# Patient Record
Sex: FEMALE | Race: WHITE | NOT HISPANIC OR LATINO | Employment: UNEMPLOYED | URBAN - METROPOLITAN AREA
[De-identification: names, ages, dates, MRNs, and addresses within clinical notes are randomized per-mention and may not be internally consistent; named-entity substitution may affect disease eponyms.]

---

## 2017-07-03 ENCOUNTER — HOSPITAL ENCOUNTER (OUTPATIENT)
Dept: URGENT CARE | Facility: CLINIC | Age: 57
Discharge: HOME | End: 2017-07-03
Attending: FAMILY MEDICINE | Admitting: FAMILY MEDICINE

## 2017-12-07 ENCOUNTER — HOSPITAL ENCOUNTER (OUTPATIENT)
Dept: LAB | Facility: HOSPITAL | Age: 57
Discharge: HOME | End: 2017-12-07
Attending: PSYCHIATRY & NEUROLOGY | Admitting: PSYCHIATRY & NEUROLOGY

## 2017-12-07 LAB
ALBUMIN SERPL-MCNC: 3.8 G/DL (ref 3.5–4.8)
ALPHA1 GLOB FLD ELPH-MCNC: 0.2 GM/DL (ref 0.1–0.4)
ALPHA2 GLOB SERPL ELPH-MCNC: 0.8 GM/DL (ref 0.5–1)
B-GLOBULIN SERPL ELPH-MCNC: 1.1 GM/DL (ref 0.7–1.4)
BASOPHILS # BLD AUTO: 0 10*3/UL (ref 0–0.2)
BASOPHILS NFR BLD AUTO: 1 % (ref 0–2)
CONV TOTAL PROTEIN: 6.4 G/DL (ref 6.1–7.9)
DIFFERENTIAL METHOD BLD: (no result)
EOSINOPHIL # BLD AUTO: 0.2 10*3/UL (ref 0–0.3)
EOSINOPHIL # BLD AUTO: 3 % (ref 0–3)
ERYTHROCYTE [DISTWIDTH] IN BLOOD BY AUTOMATED COUNT: 14.3 % (ref 11.5–14.5)
ERYTHROCYTE [SEDIMENTATION RATE] IN BLOOD BY WESTERGREN METHOD: 12 MM/HR (ref 0–30)
GAMMA GLOB SERPL ELPH-MCNC: 0.5 GM/DL (ref 0.6–1.6)
HCT VFR BLD AUTO: 42.2 % (ref 35–49)
HGB BLD-MCNC: 14.5 G/DL (ref 12–15)
INSULIN SERPL-ACNC: ABNORMAL U[IU]/ML
LYMPHOCYTES # BLD AUTO: 1.5 10*3/UL (ref 0.8–4.8)
LYMPHOCYTES NFR BLD AUTO: 30 % (ref 18–42)
MCH RBC QN AUTO: 31.1 PG (ref 26–32)
MCHC RBC AUTO-ENTMCNC: 34.3 G/DL (ref 32–36)
MCV RBC AUTO: 90.7 FL (ref 80–94)
MONOCYTES # BLD AUTO: 0.4 10*3/UL (ref 0.1–1.3)
MONOCYTES NFR BLD AUTO: 8 % (ref 2–11)
NEUTROPHILS # BLD AUTO: 3 10*3/UL (ref 2.3–8.6)
NEUTROPHILS NFR BLD AUTO: 58 % (ref 50–75)
NRBC BLD AUTO-RTO: 0 /100{WBCS}
NRBC/RBC NFR BLD MANUAL: 0 10*3/UL
PLATELET # BLD AUTO: 139 10*3/UL (ref 150–450)
PMV BLD AUTO: 7.8 FL (ref 7.4–10.4)
RBC # BLD AUTO: 4.65 10*6/UL (ref 4–5.4)
TSH SERPL-ACNC: 1.92 UIU/ML (ref 0.34–5.6)
VIT B12 SERPL-MCNC: 866 PG/ML (ref 180–914)
WBC # BLD AUTO: 5.1 10*3/UL (ref 4.5–11.5)

## 2017-12-08 LAB
ANA SER QL IA: NORMAL
CHROMATIN AB SERPL-ACNC: <20 [IU]/ML (ref 0–20)
T PALLIDUM IGG SER QL: NONREACTIVE

## 2018-05-29 ENCOUNTER — HOSPITAL ENCOUNTER (OUTPATIENT)
Dept: URGENT CARE | Facility: CLINIC | Age: 58
Discharge: HOME | End: 2018-05-29
Attending: FAMILY MEDICINE | Admitting: FAMILY MEDICINE

## 2018-10-30 ENCOUNTER — HOSPITAL ENCOUNTER (OUTPATIENT)
Dept: LAB | Facility: HOSPITAL | Age: 58
Discharge: HOME | End: 2018-10-30
Attending: SPECIALIST | Admitting: SPECIALIST

## 2018-10-30 LAB — TSH SERPL-ACNC: 0.97 UIU/ML (ref 0.34–5.6)

## 2018-11-16 ENCOUNTER — HOSPITAL ENCOUNTER (OUTPATIENT)
Dept: MAMMOGRAPHY | Facility: HOSPITAL | Age: 58
Discharge: HOME | End: 2018-11-16
Attending: NURSE PRACTITIONER | Admitting: NURSE PRACTITIONER

## 2019-02-12 ENCOUNTER — HOSPITAL ENCOUNTER (OUTPATIENT)
Dept: INFUSION THERAPY | Facility: HOSPITAL | Age: 59
Discharge: HOME | End: 2019-02-12
Attending: INTERNAL MEDICINE | Admitting: INTERNAL MEDICINE

## 2019-02-12 LAB
ALBUMIN SERPL-MCNC: 3.4 G/DL (ref 3.5–4.8)
ALBUMIN/GLOB SERPL: 1.6 {RATIO} (ref 1–1.7)
ALP SERPL-CCNC: 108 IU/L (ref 32–91)
ALT SERPL-CCNC: 14 IU/L (ref 14–54)
ANION GAP SERPL CALC-SCNC: 15.3 MMOL/L (ref 10–20)
AST SERPL-CCNC: 21 IU/L (ref 15–41)
BASOPHILS # BLD AUTO: 0 10*3/UL (ref 0–0.2)
BASOPHILS NFR BLD AUTO: 1 % (ref 0–2)
BILIRUB SERPL-MCNC: 0.3 MG/DL (ref 0.3–1.2)
BUN SERPL-MCNC: 10 MG/DL (ref 8–20)
BUN/CREAT SERPL: 14.3 (ref 5.4–26.2)
CALCIUM SERPL-MCNC: 9 MG/DL (ref 8.9–10.3)
CHLORIDE SERPL-SCNC: 105 MMOL/L (ref 101–111)
CONV CO2: 22 MMOL/L (ref 22–32)
CONV TOTAL PROTEIN: 5.5 G/DL (ref 6.1–7.9)
CREAT UR-MCNC: 0.7 MG/DL (ref 0.4–1)
CRP SERPL-MCNC: 0.37 MG/DL (ref 0–0.7)
DIFFERENTIAL METHOD BLD: (no result)
EOSINOPHIL # BLD AUTO: 0.5 10*3/UL (ref 0–0.3)
EOSINOPHIL # BLD AUTO: 8 % (ref 0–3)
ERYTHROCYTE [DISTWIDTH] IN BLOOD BY AUTOMATED COUNT: 13.2 % (ref 11.5–14.5)
ERYTHROCYTE [SEDIMENTATION RATE] IN BLOOD BY WESTERGREN METHOD: 36 MM/HR (ref 0–30)
GLOBULIN UR ELPH-MCNC: 2.1 G/DL (ref 2.5–3.8)
GLUCOSE SERPL-MCNC: 129 MG/DL (ref 65–99)
HCT VFR BLD AUTO: 35.4 % (ref 35–49)
HGB BLD-MCNC: 12.1 G/DL (ref 12–15)
LYMPHOCYTES # BLD AUTO: 1.2 10*3/UL (ref 0.8–4.8)
LYMPHOCYTES NFR BLD AUTO: 22 % (ref 18–42)
MCH RBC QN AUTO: 32.1 PG (ref 26–32)
MCHC RBC AUTO-ENTMCNC: 34.2 G/DL (ref 32–36)
MCV RBC AUTO: 93.8 FL (ref 80–94)
MONOCYTES # BLD AUTO: 0.3 10*3/UL (ref 0.1–1.3)
MONOCYTES NFR BLD AUTO: 6 % (ref 2–11)
NEUTROPHILS # BLD AUTO: 3.5 10*3/UL (ref 2.3–8.6)
NEUTROPHILS NFR BLD AUTO: 63 % (ref 50–75)
NRBC BLD AUTO-RTO: 0 /100{WBCS}
NRBC/RBC NFR BLD MANUAL: 0 10*3/UL
PLATELET # BLD AUTO: 175 10*3/UL (ref 150–450)
PMV BLD AUTO: 7.5 FL (ref 7.4–10.4)
POTASSIUM SERPL-SCNC: 3.3 MMOL/L (ref 3.6–5.1)
RBC # BLD AUTO: 3.77 10*6/UL (ref 4–5.4)
SODIUM SERPL-SCNC: 139 MMOL/L (ref 136–144)
WBC # BLD AUTO: 5.6 10*3/UL (ref 4.5–11.5)

## 2019-11-19 ENCOUNTER — TRANSCRIBE ORDERS (OUTPATIENT)
Dept: ADMINISTRATIVE | Facility: HOSPITAL | Age: 59
End: 2019-11-19

## 2019-11-19 DIAGNOSIS — Z12.39 SCREENING BREAST EXAMINATION: Primary | ICD-10-CM

## 2019-12-02 ENCOUNTER — HOSPITAL ENCOUNTER (OUTPATIENT)
Dept: MAMMOGRAPHY | Facility: HOSPITAL | Age: 59
Discharge: HOME | End: 2019-12-02
Admitting: NURSE PRACTITIONER

## 2019-12-02 DIAGNOSIS — Z12.39 SCREENING BREAST EXAMINATION: ICD-10-CM

## 2019-12-02 PROCEDURE — 77067 SCR MAMMO BI INCL CAD: CPT

## 2019-12-02 PROCEDURE — 77063 BREAST TOMOSYNTHESIS BI: CPT
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
## 2020-01-23 PROBLEM — Z82.3 FAMILY HISTORY OF CEREBROVASCULAR ACCIDENT (CVA): Status: ACTIVE | Noted: 2020-01-23

## 2020-01-23 PROBLEM — S93.622A SPRAIN OF TARSOMETATARSAL LIGAMENT OF LEFT FOOT: Status: ACTIVE | Noted: 2017-07-03

## 2020-01-23 PROBLEM — I10 HYPERTENSION: Status: ACTIVE | Noted: 2020-01-23

## 2020-01-23 PROBLEM — M86.9: Status: ACTIVE | Noted: 2019-02-12

## 2020-01-23 PROBLEM — Z83.3 FAMILY HISTORY OF DIABETES MELLITUS: Status: ACTIVE | Noted: 2020-01-23

## 2020-01-23 PROBLEM — Z80.3 FAMILY HISTORY OF MALIGNANT NEOPLASM OF BREAST: Status: ACTIVE | Noted: 2020-01-23

## 2020-01-23 PROBLEM — F32.A DEPRESSION: Status: ACTIVE | Noted: 2020-01-23

## 2020-01-23 PROBLEM — G47.30 SLEEP APNEA: Status: ACTIVE | Noted: 2020-01-23

## 2020-01-23 PROBLEM — S62.002A: Status: ACTIVE | Noted: 2018-05-29

## 2020-01-23 PROBLEM — F41.9 ANXIETY DISORDER: Status: ACTIVE | Noted: 2020-01-23

## 2020-01-23 PROBLEM — G43.909 HEADACHE, MIGRAINE: Status: ACTIVE | Noted: 2020-01-23

## 2020-01-23 RX ORDER — L-METHYLFOLATE-ALGAE-VIT B12-B6 CAP 3-90.314-2-35 MG 3-90.314-2-35 MG
1 CAP ORAL
COMMUNITY
End: 2020-01-24

## 2020-01-23 RX ORDER — MELOXICAM 15 MG/1
15 TABLET ORAL DAILY
COMMUNITY
Start: 2018-11-21

## 2020-01-23 RX ORDER — VILAZODONE HYDROCHLORIDE 40 MG/1
40 TABLET ORAL DAILY
COMMUNITY
Start: 2018-12-24

## 2020-01-23 RX ORDER — CYCLOBENZAPRINE HCL 10 MG
TABLET ORAL
COMMUNITY
Start: 2016-02-04 | End: 2020-12-02

## 2020-01-23 RX ORDER — VALSARTAN 320 MG/1
320 TABLET ORAL DAILY
COMMUNITY
Start: 2019-11-05

## 2020-01-23 RX ORDER — VALACYCLOVIR HYDROCHLORIDE 1 G/1
TABLET, FILM COATED ORAL
COMMUNITY
Start: 2012-08-21 | End: 2020-12-02

## 2020-01-23 RX ORDER — GABAPENTIN 300 MG/1
900 CAPSULE ORAL 2 TIMES DAILY
COMMUNITY
Start: 2019-01-21

## 2020-01-23 RX ORDER — HYDROCHLOROTHIAZIDE 25 MG/1
25 TABLET ORAL DAILY
COMMUNITY
Start: 2019-10-29

## 2020-01-23 RX ORDER — OMEPRAZOLE 20 MG/1
CAPSULE, DELAYED RELEASE ORAL
COMMUNITY
Start: 2019-11-04 | End: 2020-01-24

## 2020-01-23 RX ORDER — PRAVASTATIN SODIUM 40 MG
40 TABLET ORAL NIGHTLY
COMMUNITY
Start: 2019-10-27

## 2020-01-23 RX ORDER — METFORMIN HYDROCHLORIDE 500 MG/1
500 TABLET, EXTENDED RELEASE ORAL
COMMUNITY
Start: 2018-12-27

## 2020-01-23 RX ORDER — HYDROCODONE BITARTRATE AND ACETAMINOPHEN 5; 325 MG/1; MG/1
TABLET ORAL
Refills: 0 | COMMUNITY
Start: 2019-10-28 | End: 2020-12-02

## 2020-01-23 RX ORDER — TURMERIC ROOT EXTRACT 500 MG
1 TABLET ORAL DAILY
COMMUNITY

## 2020-01-23 RX ORDER — AMLODIPINE, VALSARTAN AND HYDROCHLOROTHIAZIDE 10; 320; 25 MG/1; MG/1; MG/1
TABLET ORAL
COMMUNITY
Start: 2017-01-23 | End: 2020-12-02

## 2020-01-23 RX ORDER — CHLORAL HYDRATE 500 MG
1000 CAPSULE ORAL 2 TIMES DAILY WITH MEALS
COMMUNITY

## 2020-01-23 RX ORDER — NYSTATIN 100000 [USP'U]/G
POWDER TOPICAL
COMMUNITY
Start: 2019-11-16 | End: 2020-12-02

## 2020-01-23 RX ORDER — TOPIRAMATE 50 MG/1
TABLET, FILM COATED ORAL
COMMUNITY
Start: 2019-11-14 | End: 2020-12-02

## 2020-01-23 RX ORDER — CEPHALEXIN 250 MG/1
CAPSULE ORAL
Refills: 0 | COMMUNITY
Start: 2019-10-28 | End: 2020-12-02

## 2020-01-23 RX ORDER — LORAZEPAM 0.5 MG/1
TABLET ORAL
COMMUNITY
Start: 2018-01-22 | End: 2020-12-02

## 2020-01-23 RX ORDER — BUTALBITAL, ACETAMINOPHEN, CAFFEINE AND CODEINE PHOSPHATE 300; 50; 40; 30 MG/1; MG/1; MG/1; MG/1
1 CAPSULE ORAL EVERY 6 HOURS
COMMUNITY
Start: 2018-01-22 | End: 2020-12-02

## 2020-01-23 RX ORDER — TRAMADOL HYDROCHLORIDE 50 MG/1
TABLET ORAL EVERY 6 HOURS
COMMUNITY
Start: 2016-02-04 | End: 2020-12-02

## 2020-01-23 NOTE — PROGRESS NOTES
Sleep medicine follow-up visit    Eve Skelton   1960  59 y.o. female   DATE OF SERVICE: 1/24/2020     Chief complaint alayna treated with cpap    Yearly f/u for cpap compliance, patient uses a nasal mask and goes through [employer] for supplies. Sleeping well.    RLS, doing well with Gabapentin, unchanged. Pt is  Diabetic and is having more neuropathy in feet.    Headache currently taking [id number], can't recall last headache.      New machine about 2017  On NPSG at [facility] , 4- patient had Moderate obstructive sleep apnea syndrome with apnea-hypopnea index of 17.0 per sleep hour, minimum SpO2 of 86%    The compliance data reviewed and the patient is on CPAP therapy at 10-20 cm/H2O and compliance data indicates excellent compliance with 95% usage for more than 4 hours with an average usage of 7 hours 32 minutes. AHI down to 2.6 with CPAP therapy and mean CPAP pressure 10.9 cm of water.  The patient's hypersomnia also resolved with [place] Sleepiness Scale score of 9 with CPAP therapy.  The patient feels great and is benefiting from it and is compliant.     Obesity, was up to 300, now down to 250, bmi 44    (Pt seeing dr NASH on Topamax for headaches)    Review of Systems   Constitutional: Negative for activity change and appetite change.   HENT: Negative for sinus pressure and sinus pain.    Eyes: Negative for discharge and itching.   Respiratory: Negative for cough and shortness of breath.    Gastrointestinal: Negative for abdominal pain and nausea.   Endocrine: Negative for cold intolerance and heat intolerance.   Genitourinary: Negative for urgency.   Musculoskeletal: Negative for back pain, neck pain and neck stiffness.   Neurological: Negative for light-headedness and headaches.   Psychiatric/Behavioral: Negative for dysphoric mood and hallucinations.     I reviewed and addressed ROS entered by MA.    The following portions of the patient's history were reviewed and updated  as appropriate: allergies, current medications, past family history, past medical history, past social history, past surgical history and problem list.      Family History   Problem Relation Age of Onset   • No Known Problems Mother    • Breast cancer Father    • Breast cancer Sister    • Breast cancer Brother    • No Known Problems Daughter    • No Known Problems Son    • No Known Problems Maternal Grandmother    • No Known Problems Paternal Grandmother    • No Known Problems Maternal Aunt    • No Known Problems Paternal Aunt        Past Medical History:   Diagnosis Date   • Anxiety    • Breast cancer (CMS/HCC)    • Diabetes (CMS/HCC)    • Headache    • HTN (hypertension)    • RLS (restless legs syndrome)        Social History     Socioeconomic History   • Marital status:      Spouse name: Not on file   • Number of children: Not on file   • Years of education: Not on file   • Highest education level: Not on file   Tobacco Use   • Smoking status: Former Smoker     Last attempt to quit:      Years since quittin.0   • Smokeless tobacco: Never Used   Substance and Sexual Activity   • Alcohol use: Yes   • Drug use: Never   • Sexual activity: Defer         Current Outpatient Medications:   •  aspirin 81 MG tablet, Take 81 mg by mouth Daily., Disp: , Rfl:   •  Butalbital-APAP-Caff-Cod -84-30 MG capsule, 1 capsule Every 6 (Six) Hours., Disp: , Rfl:   •  Cholecalciferol (VITAMIN D-1000 MAX ST) 25 MCG (1000 UT) tablet, Take 2,000 Units by mouth Daily., Disp: , Rfl:   •  cyclobenzaprine (FLEXERIL) 10 MG tablet, CYCLOBENZAPRINE HCL 10 MG TABS, Disp: , Rfl:   •  econazole nitrate (SPECTAZOLE) 1 % cream, , Disp: , Rfl:   •  gabapentin (NEURONTIN) 300 MG capsule, Take 300 mg by mouth 3 (Three) Times a Day., Disp: , Rfl:   •  glycopyrrolate (ROBINUL) 1 MG tablet, , Disp: , Rfl:   •  hydroCHLOROthiazide (HYDRODIURIL) 25 MG tablet, , Disp: , Rfl:   •  HYDROcodone-acetaminophen (NORCO) 5-325 MG per tablet, TK 1  T PO Q 6 HOURS PRN P, Disp: , Rfl: 0  •  L-Methylfolate-Algae 15-90.314 MG capsule, TK 1 C PO QAM, Disp: , Rfl:   •  L-Methylfolate-B6-B12 3-35-2 MG tablet, Take  by mouth., Disp: , Rfl:   •  LORazepam (ATIVAN) 0.5 MG tablet, ATIVAN 0.5 MG TABS, Disp: , Rfl:   •  meloxicam (MOBIC) 15 MG tablet, Take 15 mg by mouth Daily., Disp: , Rfl:   •  metFORMIN ER (GLUCOPHAGE-XR) 500 MG 24 hr tablet, Take 500 mg by mouth., Disp: , Rfl:   •  NYSTATIN 831101 UNIT/GM powder, , Disp: , Rfl:   •  Omega-3 Fatty Acids (FISH OIL) 1000 MG capsule capsule, Take  by mouth., Disp: , Rfl:   •  pravastatin (PRAVACHOL) 40 MG tablet, , Disp: , Rfl:   •  SSD 1 % cream, APPLY TOPICALLY TO THE AFFECTED AREA ON THE SKIN BID UNTIL HEALED, Disp: , Rfl:   •  topiramate (TOPAMAX) 100 MG tablet, , Disp: , Rfl:   •  traMADol (ULTRAM) 50 MG tablet, Every 6 (Six) Hours., Disp: , Rfl:   •  trimethoprim-polymyxin b (POLYTRIM) 19660-7.1 UNIT/ML-% ophthalmic solution, Apply  to eye(s) as directed by provider Every 4 (Four) Hours., Disp: , Rfl:   •  Turmeric 500 MG tablet, Take  by mouth., Disp: , Rfl:   •  valACYclovir (VALTREX) 1000 MG tablet, VALTREX 1 GM TABS, Disp: , Rfl:   •  valsartan (DIOVAN) 320 MG tablet, , Disp: , Rfl:   •  vilazodone (VIIBRYD) 40 MG tablet tablet, Take 40 mg by mouth Daily., Disp: , Rfl:   •  amLODIPine-Valsartan-HCTZ -25 MG tablet, AMLODIPINE-VALSARTAN-HCTZ -25 MG TABS, Disp: , Rfl:   •  buPROPion XL (WELLBUTRIN XL) 300 MG 24 hr tablet, Take 1 tablet by mouth., Disp: , Rfl:   •  cephalexin (KEFLEX) 250 MG capsule, TK 1 C PO Q 6 HOURS UNTIL ALL TAKEN, Disp: , Rfl: 0  •  Coenzyme Q10 (COQ-10) 100 MG capsule, Take  by mouth., Disp: , Rfl:   •  doxycycline (VIBRAMYCIN) 100 MG capsule, TK 1 C PO BID, Disp: , Rfl:   •  esomeprazole (nexIUM) 40 MG capsule, Take 1 capsule by mouth., Disp: , Rfl:   •  LORazepam (ATIVAN) 1 MG tablet, TAKE 1 TABLET BY MOUTH D PRN FOR ANXIETY, Disp: , Rfl:   •  Lurasidone HCl (LATUDA) 20 MG tablet  tablet, Take 1 tablet by mouth., Disp: , Rfl:   •  metoprolol succinate XL (TOPROL XL) 100 MG 24 hr tablet, Take 1 tablet by mouth., Disp: , Rfl:   •  mupirocin (BACTROBAN) 2 % ointment, , Disp: , Rfl:   •  topiramate (TOPAMAX) 50 MG tablet, , Disp: , Rfl:     No Known Allergies     PHYSICAL EXAMINATION:  Vitals:    01/24/20 0857   BP: 152/89   Pulse: 81      Body mass index is 44.29 kg/m².       HEENT: Normal.      EXTREMITIES: No edema.     IMPRESSION:     Patient with obstructive sleep apnea syndrome with hypersomnia successfully treated with CPAP therapy and is compliant and benefiting from it.     Obesity, pt is working on weight loss    RLS, controlled with gabapentin    RECOMMENDATIONS:   1. Continue present CPAP.   2. Follow up 1 year.   3. Pt encouraged to lose weight.    EPWORTH SLEEPINESS SCALE  Sitting and reading  3  WatchingTV  3  Sitting, inactive, in a public place  3  As a passenger in a car for 1 hour w/o a break  0  Lying down to rest in the afternoon  0  Sitting and talking to someone  0  Sitting quietly after a lunch  0  In a car, while stopped for traffic or a light  0  Total 9        This document has been electronically signed by Joseph Seipel, MD on January 24, 2020 9:37 AM

## 2020-01-24 ENCOUNTER — OFFICE VISIT (OUTPATIENT)
Dept: NEUROLOGY | Facility: CLINIC | Age: 60
End: 2020-01-24

## 2020-01-24 VITALS
DIASTOLIC BLOOD PRESSURE: 89 MMHG | HEART RATE: 81 BPM | SYSTOLIC BLOOD PRESSURE: 152 MMHG | BODY MASS INDEX: 43.36 KG/M2 | WEIGHT: 254 LBS | HEIGHT: 64 IN

## 2020-01-24 DIAGNOSIS — E66.01 MORBID OBESITY (HCC): ICD-10-CM

## 2020-01-24 DIAGNOSIS — G47.33 OBSTRUCTIVE SLEEP APNEA SYNDROME: Primary | ICD-10-CM

## 2020-01-24 DIAGNOSIS — G25.81 RESTLESS LEGS SYNDROME: ICD-10-CM

## 2020-01-24 PROCEDURE — 99214 OFFICE O/P EST MOD 30 MIN: CPT | Performed by: PSYCHIATRY & NEUROLOGY

## 2020-01-24 RX ORDER — MECOBAL/LEVOMEFOLAT CA/B6 PHOS 2-3-35 MG
TABLET ORAL
COMMUNITY
End: 2020-12-02

## 2020-01-24 RX ORDER — LURASIDONE HYDROCHLORIDE 20 MG/1
1 TABLET, FILM COATED ORAL
COMMUNITY
End: 2020-12-02

## 2020-01-24 RX ORDER — DOXYCYCLINE HYCLATE 100 MG/1
CAPSULE ORAL
COMMUNITY
Start: 2019-12-17 | End: 2020-12-02

## 2020-01-24 RX ORDER — LEVOMEFOLATE/ALGAL OIL 15-90.314
1 CAPSULE ORAL DAILY
COMMUNITY
Start: 2019-12-27

## 2020-01-24 RX ORDER — BUPROPION HYDROCHLORIDE 300 MG/1
1 TABLET ORAL EVERY MORNING
COMMUNITY

## 2020-01-24 RX ORDER — TOPIRAMATE 100 MG/1
100 TABLET, FILM COATED ORAL 2 TIMES DAILY
COMMUNITY
Start: 2020-01-16 | End: 2022-01-27 | Stop reason: SDUPTHER

## 2020-01-24 RX ORDER — LORAZEPAM 1 MG/1
1 TABLET ORAL DAILY PRN
COMMUNITY
Start: 2020-01-14

## 2020-01-24 RX ORDER — ESOMEPRAZOLE MAGNESIUM 40 MG/1
1 CAPSULE, DELAYED RELEASE ORAL
COMMUNITY
End: 2020-12-02

## 2020-01-24 RX ORDER — GLYCOPYRROLATE 1 MG/1
TABLET ORAL
COMMUNITY
End: 2020-12-02

## 2020-01-24 RX ORDER — SILVER SULFADIAZINE 1 %
CREAM (GRAM) TOPICAL
COMMUNITY
Start: 2020-01-20 | End: 2020-12-02

## 2020-01-24 RX ORDER — POLYMYXIN B SULFATE AND TRIMETHOPRIM 1; 10000 MG/ML; [USP'U]/ML
SOLUTION OPHTHALMIC EVERY 4 HOURS
COMMUNITY
End: 2020-12-02

## 2020-01-24 RX ORDER — METOPROLOL SUCCINATE 100 MG/1
1 TABLET, EXTENDED RELEASE ORAL
COMMUNITY
End: 2020-12-02

## 2020-02-11 RX ORDER — TOPIRAMATE 50 MG/1
TABLET, FILM COATED ORAL
Qty: 180 TABLET | Refills: 3 | OUTPATIENT
Start: 2020-02-11

## 2020-02-13 ENCOUNTER — TELEPHONE (OUTPATIENT)
Dept: NEUROLOGY | Facility: CLINIC | Age: 60
End: 2020-02-13

## 2020-02-13 DIAGNOSIS — G47.33 OBSTRUCTIVE SLEEP APNEA: Primary | ICD-10-CM

## 2020-05-20 ENCOUNTER — TRANSCRIBE ORDERS (OUTPATIENT)
Dept: ADMINISTRATIVE | Facility: HOSPITAL | Age: 60
End: 2020-05-20

## 2020-05-20 ENCOUNTER — LAB (OUTPATIENT)
Dept: LAB | Facility: HOSPITAL | Age: 60
End: 2020-05-20

## 2020-05-20 DIAGNOSIS — Z79.1 ENCOUNTER FOR LONG-TERM (CURRENT) USE OF NON-STEROIDAL ANTI-INFLAMMATORIES: Primary | ICD-10-CM

## 2020-05-20 DIAGNOSIS — Z79.1 ENCOUNTER FOR LONG-TERM (CURRENT) USE OF NON-STEROIDAL ANTI-INFLAMMATORIES: ICD-10-CM

## 2020-05-20 LAB
ALT SERPL W P-5'-P-CCNC: 28 U/L (ref 1–33)
AST SERPL-CCNC: 24 U/L (ref 1–32)
BUN BLD-MCNC: 20 MG/DL (ref 6–20)
CREAT BLD-MCNC: 0.9 MG/DL (ref 0.57–1)
GFR SERPL CREATININE-BSD FRML MDRD: 64 ML/MIN/1.73

## 2020-05-20 PROCEDURE — 84450 TRANSFERASE (AST) (SGOT): CPT

## 2020-05-20 PROCEDURE — 84460 ALANINE AMINO (ALT) (SGPT): CPT

## 2020-05-20 PROCEDURE — 36415 COLL VENOUS BLD VENIPUNCTURE: CPT

## 2020-05-20 PROCEDURE — 82565 ASSAY OF CREATININE: CPT

## 2020-05-20 PROCEDURE — 84520 ASSAY OF UREA NITROGEN: CPT

## 2020-11-24 ENCOUNTER — TRANSCRIBE ORDERS (OUTPATIENT)
Dept: ADMINISTRATIVE | Facility: HOSPITAL | Age: 60
End: 2020-11-24

## 2020-11-24 DIAGNOSIS — Z13.820 ENCOUNTER FOR SCREENING FOR OSTEOPOROSIS: Primary | ICD-10-CM

## 2020-11-24 DIAGNOSIS — Z12.31 SCREENING MAMMOGRAM, ENCOUNTER FOR: Primary | ICD-10-CM

## 2020-12-02 ENCOUNTER — HOSPITAL ENCOUNTER (OUTPATIENT)
Facility: HOSPITAL | Age: 60
Discharge: HOME | End: 2020-12-04
Attending: INTERNAL MEDICINE | Admitting: ORTHOPAEDIC SURGERY

## 2020-12-02 ENCOUNTER — APPOINTMENT (OUTPATIENT)
Dept: GENERAL RADIOLOGY | Facility: HOSPITAL | Age: 60
End: 2020-12-02

## 2020-12-02 DIAGNOSIS — S42.292A CLOSED FRACTURE OF HEAD OF LEFT HUMERUS, INITIAL ENCOUNTER: ICD-10-CM

## 2020-12-02 DIAGNOSIS — M25.412 EFFUSION OF LEFT SHOULDER JOINT: ICD-10-CM

## 2020-12-02 DIAGNOSIS — S42.222A CLOSED 2-PART DISPLACED FRACTURE OF SURGICAL NECK OF LEFT HUMERUS, INITIAL ENCOUNTER: Primary | ICD-10-CM
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
## 2020-12-02 PROBLEM — S42.309A CLOSED FRACTURE OF HUMERUS: Status: ACTIVE | Noted: 2020-12-02

## 2020-12-02 LAB
ABO GROUP BLD: NORMAL
ANION GAP SERPL CALCULATED.3IONS-SCNC: 12 MMOL/L (ref 5–15)
APTT PPP: 23.6 SECONDS (ref 24–31)
BASOPHILS # BLD AUTO: 0 10*3/MM3 (ref 0–0.2)
BASOPHILS NFR BLD AUTO: 0.3 % (ref 0–1.5)
BLD GP AB SCN SERPL QL: NEGATIVE
BUN SERPL-MCNC: 16 MG/DL (ref 6–20)
BUN/CREAT SERPL: 23.5 (ref 7–25)
CALCIUM SPEC-SCNC: 9.8 MG/DL (ref 8.6–10.5)
CHLORIDE SERPL-SCNC: 104 MMOL/L (ref 98–107)
CO2 SERPL-SCNC: 24 MMOL/L (ref 22–29)
CREAT SERPL-MCNC: 0.68 MG/DL (ref 0.57–1)
DEPRECATED RDW RBC AUTO: 43.8 FL (ref 37–54)
EOSINOPHIL # BLD AUTO: 0.2 10*3/MM3 (ref 0–0.4)
EOSINOPHIL NFR BLD AUTO: 1.6 % (ref 0.3–6.2)
ERYTHROCYTE [DISTWIDTH] IN BLOOD BY AUTOMATED COUNT: 13.4 % (ref 12.3–15.4)
GFR SERPL CREATININE-BSD FRML MDRD: 89 ML/MIN/1.73
GLUCOSE BLDC GLUCOMTR-MCNC: 119 MG/DL (ref 70–105)
GLUCOSE SERPL-MCNC: 117 MG/DL (ref 65–99)
HCT VFR BLD AUTO: 42.1 % (ref 34–46.6)
HGB BLD-MCNC: 14.5 G/DL (ref 12–15.9)
INR PPP: 1 (ref 0.93–1.1)
LYMPHOCYTES # BLD AUTO: 1.3 10*3/MM3 (ref 0.7–3.1)
LYMPHOCYTES NFR BLD AUTO: 13.1 % (ref 19.6–45.3)
MCH RBC QN AUTO: 32.1 PG (ref 26.6–33)
MCHC RBC AUTO-ENTMCNC: 34.3 G/DL (ref 31.5–35.7)
MCV RBC AUTO: 93.7 FL (ref 79–97)
MONOCYTES # BLD AUTO: 0.6 10*3/MM3 (ref 0.1–0.9)
MONOCYTES NFR BLD AUTO: 6.6 % (ref 5–12)
NEUTROPHILS NFR BLD AUTO: 7.7 10*3/MM3 (ref 1.7–7)
NEUTROPHILS NFR BLD AUTO: 78.4 % (ref 42.7–76)
NRBC BLD AUTO-RTO: 0.1 /100 WBC (ref 0–0.2)
PLATELET # BLD AUTO: 150 10*3/MM3 (ref 140–450)
PMV BLD AUTO: 7.5 FL (ref 6–12)
POTASSIUM SERPL-SCNC: 3.3 MMOL/L (ref 3.5–5.2)
PROTHROMBIN TIME: 11 SECONDS (ref 9.6–11.7)
RBC # BLD AUTO: 4.5 10*6/MM3 (ref 3.77–5.28)
RH BLD: POSITIVE
SARS-COV-2 RNA PNL SPEC NAA+PROBE: NOT DETECTED
SODIUM SERPL-SCNC: 140 MMOL/L (ref 136–145)
T&S EXPIRATION DATE: NORMAL
WBC # BLD AUTO: 9.8 10*3/MM3 (ref 3.4–10.8)

## 2020-12-02 PROCEDURE — 82962 GLUCOSE BLOOD TEST: CPT

## 2020-12-02 PROCEDURE — 87635 SARS-COV-2 COVID-19 AMP PRB: CPT | Performed by: NURSE PRACTITIONER

## 2020-12-02 PROCEDURE — 86850 RBC ANTIBODY SCREEN: CPT | Performed by: NURSE PRACTITIONER

## 2020-12-02 PROCEDURE — 86900 BLOOD TYPING SEROLOGIC ABO: CPT

## 2020-12-02 PROCEDURE — 25010000002 MORPHINE PER 10 MG: Performed by: NURSE PRACTITIONER

## 2020-12-02 PROCEDURE — 86901 BLOOD TYPING SEROLOGIC RH(D): CPT | Performed by: NURSE PRACTITIONER

## 2020-12-02 PROCEDURE — G0378 HOSPITAL OBSERVATION PER HR: HCPCS

## 2020-12-02 PROCEDURE — 80048 BASIC METABOLIC PNL TOTAL CA: CPT | Performed by: NURSE PRACTITIONER

## 2020-12-02 PROCEDURE — 25010000002 ONDANSETRON PER 1 MG: Performed by: NURSE PRACTITIONER

## 2020-12-02 PROCEDURE — 85025 COMPLETE CBC W/AUTO DIFF WBC: CPT | Performed by: NURSE PRACTITIONER

## 2020-12-02 PROCEDURE — 85610 PROTHROMBIN TIME: CPT | Performed by: NURSE PRACTITIONER

## 2020-12-02 PROCEDURE — 85730 THROMBOPLASTIN TIME PARTIAL: CPT | Performed by: NURSE PRACTITIONER

## 2020-12-02 PROCEDURE — 99283 EMERGENCY DEPT VISIT LOW MDM: CPT

## 2020-12-02 PROCEDURE — 86901 BLOOD TYPING SEROLOGIC RH(D): CPT

## 2020-12-02 PROCEDURE — 99219 PR INITIAL OBSERVATION CARE/DAY 50 MINUTES: CPT | Performed by: NURSE PRACTITIONER

## 2020-12-02 PROCEDURE — 86900 BLOOD TYPING SEROLOGIC ABO: CPT | Performed by: NURSE PRACTITIONER

## 2020-12-02 PROCEDURE — 96374 THER/PROPH/DIAG INJ IV PUSH: CPT

## 2020-12-02 PROCEDURE — 96375 TX/PRO/DX INJ NEW DRUG ADDON: CPT

## 2020-12-02 PROCEDURE — 93005 ELECTROCARDIOGRAM TRACING: CPT | Performed by: NURSE PRACTITIONER

## 2020-12-02 PROCEDURE — 71045 X-RAY EXAM CHEST 1 VIEW: CPT

## 2020-12-02 PROCEDURE — C9803 HOPD COVID-19 SPEC COLLECT: HCPCS

## 2020-12-02 RX ORDER — SODIUM CHLORIDE 0.9 % (FLUSH) 0.9 %
3 SYRINGE (ML) INJECTION EVERY 12 HOURS SCHEDULED
Status: DISCONTINUED | OUTPATIENT
Start: 2020-12-02 | End: 2020-12-03 | Stop reason: HOSPADM

## 2020-12-02 RX ORDER — DEXTROSE MONOHYDRATE 25 G/50ML
25 INJECTION, SOLUTION INTRAVENOUS
Status: DISCONTINUED | OUTPATIENT
Start: 2020-12-02 | End: 2020-12-04 | Stop reason: HOSPADM

## 2020-12-02 RX ORDER — BISACODYL 10 MG
10 SUPPOSITORY, RECTAL RECTAL DAILY PRN
Status: DISCONTINUED | OUTPATIENT
Start: 2020-12-02 | End: 2020-12-04 | Stop reason: HOSPADM

## 2020-12-02 RX ORDER — SODIUM CHLORIDE 0.9 % (FLUSH) 0.9 %
10 SYRINGE (ML) INJECTION AS NEEDED
Status: DISCONTINUED | OUTPATIENT
Start: 2020-12-02 | End: 2020-12-04 | Stop reason: HOSPADM

## 2020-12-02 RX ORDER — BUPROPION HYDROCHLORIDE 150 MG/1
300 TABLET ORAL DAILY
Status: DISCONTINUED | OUTPATIENT
Start: 2020-12-03 | End: 2020-12-04 | Stop reason: HOSPADM

## 2020-12-02 RX ORDER — SODIUM CHLORIDE 0.9 % (FLUSH) 0.9 %
3-10 SYRINGE (ML) INJECTION AS NEEDED
Status: DISCONTINUED | OUTPATIENT
Start: 2020-12-02 | End: 2020-12-03 | Stop reason: HOSPADM

## 2020-12-02 RX ORDER — GABAPENTIN 300 MG/1
600 CAPSULE ORAL DAILY
COMMUNITY

## 2020-12-02 RX ORDER — HYDROCHLOROTHIAZIDE 25 MG/1
25 TABLET ORAL DAILY
Status: DISCONTINUED | OUTPATIENT
Start: 2020-12-03 | End: 2020-12-03 | Stop reason: HOSPADM

## 2020-12-02 RX ORDER — MORPHINE SULFATE 4 MG/ML
4 INJECTION, SOLUTION INTRAMUSCULAR; INTRAVENOUS ONCE
Status: COMPLETED | OUTPATIENT
Start: 2020-12-02 | End: 2020-12-02

## 2020-12-02 RX ORDER — OMEPRAZOLE 20 MG/1
20 CAPSULE, DELAYED RELEASE ORAL DAILY
COMMUNITY

## 2020-12-02 RX ORDER — ONDANSETRON 2 MG/ML
4 INJECTION INTRAMUSCULAR; INTRAVENOUS EVERY 6 HOURS PRN
Status: DISCONTINUED | OUTPATIENT
Start: 2020-12-02 | End: 2020-12-03 | Stop reason: HOSPADM

## 2020-12-02 RX ORDER — MORPHINE SULFATE 4 MG/ML
4 INJECTION, SOLUTION INTRAMUSCULAR; INTRAVENOUS EVERY 4 HOURS PRN
Status: DISCONTINUED | OUTPATIENT
Start: 2020-12-02 | End: 2020-12-03 | Stop reason: HOSPADM

## 2020-12-02 RX ORDER — GABAPENTIN 300 MG/1
600 CAPSULE ORAL DAILY
Status: DISCONTINUED | OUTPATIENT
Start: 2020-12-03 | End: 2020-12-04 | Stop reason: HOSPADM

## 2020-12-02 RX ORDER — NICOTINE POLACRILEX 4 MG
15 LOZENGE BUCCAL
Status: DISCONTINUED | OUTPATIENT
Start: 2020-12-02 | End: 2020-12-04 | Stop reason: HOSPADM

## 2020-12-02 RX ORDER — PANTOPRAZOLE SODIUM 40 MG/1
40 TABLET, DELAYED RELEASE ORAL
Status: DISCONTINUED | OUTPATIENT
Start: 2020-12-03 | End: 2020-12-04 | Stop reason: HOSPADM

## 2020-12-02 RX ORDER — MELATONIN
2000 DAILY
Status: DISCONTINUED | OUTPATIENT
Start: 2020-12-03 | End: 2020-12-04 | Stop reason: HOSPADM

## 2020-12-02 RX ORDER — GABAPENTIN 300 MG/1
900 CAPSULE ORAL 2 TIMES DAILY
Status: DISCONTINUED | OUTPATIENT
Start: 2020-12-03 | End: 2020-12-04 | Stop reason: HOSPADM

## 2020-12-02 RX ORDER — BISACODYL 5 MG/1
5 TABLET, DELAYED RELEASE ORAL DAILY PRN
Status: DISCONTINUED | OUTPATIENT
Start: 2020-12-02 | End: 2020-12-04 | Stop reason: HOSPADM

## 2020-12-02 RX ORDER — ATORVASTATIN CALCIUM 10 MG/1
10 TABLET, FILM COATED ORAL DAILY
Status: DISCONTINUED | OUTPATIENT
Start: 2020-12-03 | End: 2020-12-04 | Stop reason: HOSPADM

## 2020-12-02 RX ORDER — INSULIN LISPRO 100 [IU]/ML
0-7 INJECTION, SOLUTION INTRAVENOUS; SUBCUTANEOUS
Status: DISCONTINUED | OUTPATIENT
Start: 2020-12-03 | End: 2020-12-04 | Stop reason: HOSPADM

## 2020-12-02 RX ORDER — TOPIRAMATE 100 MG/1
100 TABLET, FILM COATED ORAL 2 TIMES DAILY
Status: DISCONTINUED | OUTPATIENT
Start: 2020-12-03 | End: 2020-12-04 | Stop reason: HOSPADM

## 2020-12-02 RX ORDER — INSULIN LISPRO 100 [IU]/ML
0-7 INJECTION, SOLUTION INTRAVENOUS; SUBCUTANEOUS AS NEEDED
Status: DISCONTINUED | OUTPATIENT
Start: 2020-12-02 | End: 2020-12-04 | Stop reason: HOSPADM

## 2020-12-02 RX ORDER — ONDANSETRON 2 MG/ML
4 INJECTION INTRAMUSCULAR; INTRAVENOUS ONCE
Status: COMPLETED | OUTPATIENT
Start: 2020-12-02 | End: 2020-12-02

## 2020-12-02 RX ORDER — VALSARTAN 80 MG/1
320 TABLET ORAL DAILY
Status: DISCONTINUED | OUTPATIENT
Start: 2020-12-03 | End: 2020-12-04 | Stop reason: HOSPADM

## 2020-12-02 RX ORDER — DEXTROSE AND SODIUM CHLORIDE 5; .45 G/100ML; G/100ML
100 INJECTION, SOLUTION INTRAVENOUS CONTINUOUS
Status: DISCONTINUED | OUTPATIENT
Start: 2020-12-02 | End: 2020-12-03

## 2020-12-02 RX ORDER — VILAZODONE HYDROCHLORIDE 40 MG/1
40 TABLET ORAL DAILY
Status: DISCONTINUED | OUTPATIENT
Start: 2020-12-03 | End: 2020-12-04 | Stop reason: HOSPADM

## 2020-12-02 RX ADMIN — MORPHINE SULFATE 4 MG: 4 INJECTION INTRAVENOUS at 19:39

## 2020-12-02 RX ADMIN — ONDANSETRON 4 MG: 2 INJECTION, SOLUTION INTRAMUSCULAR; INTRAVENOUS at 19:39

## 2020-12-02 NOTE — ED NOTES
Pt presents to the ED after having an xray done at [facility] after sustaining a fall earlier this morning. Pt was told she had a broken left arm and needed to come to the ED for further evaluation and treatment. Pt wearing a sling upon arrival.      Lachelle Charles, RN  12/02/20 5621

## 2020-12-03 ENCOUNTER — ANESTHESIA EVENT (OUTPATIENT)
Dept: PERIOP | Facility: HOSPITAL | Age: 60
End: 2020-12-03

## 2020-12-03 ENCOUNTER — APPOINTMENT (OUTPATIENT)
Dept: GENERAL RADIOLOGY | Facility: HOSPITAL | Age: 60
End: 2020-12-03

## 2020-12-03 ENCOUNTER — ANESTHESIA (OUTPATIENT)
Dept: PERIOP | Facility: HOSPITAL | Age: 60
End: 2020-12-03

## 2020-12-03 PROBLEM — S42.222A CLOSED 2-PART DISPLACED FRACTURE OF SURGICAL NECK OF LEFT HUMERUS: Status: ACTIVE | Noted: 2020-12-02

## 2020-12-03 LAB
ANION GAP SERPL CALCULATED.3IONS-SCNC: 9 MMOL/L (ref 5–15)
BASOPHILS # BLD AUTO: 0 10*3/MM3 (ref 0–0.2)
BASOPHILS NFR BLD AUTO: 0.3 % (ref 0–1.5)
BUN SERPL-MCNC: 17 MG/DL (ref 6–20)
BUN/CREAT SERPL: 22.7 (ref 7–25)
CALCIUM SPEC-SCNC: 9 MG/DL (ref 8.6–10.5)
CHLORIDE SERPL-SCNC: 106 MMOL/L (ref 98–107)
CO2 SERPL-SCNC: 25 MMOL/L (ref 22–29)
CREAT SERPL-MCNC: 0.75 MG/DL (ref 0.57–1)
DEPRECATED RDW RBC AUTO: 45.9 FL (ref 37–54)
EOSINOPHIL # BLD AUTO: 0.2 10*3/MM3 (ref 0–0.4)
EOSINOPHIL NFR BLD AUTO: 2.6 % (ref 0.3–6.2)
ERYTHROCYTE [DISTWIDTH] IN BLOOD BY AUTOMATED COUNT: 13.9 % (ref 12.3–15.4)
GFR SERPL CREATININE-BSD FRML MDRD: 79 ML/MIN/1.73
GLUCOSE BLDC GLUCOMTR-MCNC: 120 MG/DL (ref 70–105)
GLUCOSE BLDC GLUCOMTR-MCNC: 124 MG/DL (ref 70–105)
GLUCOSE BLDC GLUCOMTR-MCNC: 125 MG/DL (ref 70–105)
GLUCOSE BLDC GLUCOMTR-MCNC: 195 MG/DL (ref 70–105)
GLUCOSE SERPL-MCNC: 121 MG/DL (ref 65–99)
HBA1C MFR BLD: 5.6 % (ref 3.5–5.6)
HCT VFR BLD AUTO: 40 % (ref 34–46.6)
HGB BLD-MCNC: 13.5 G/DL (ref 12–15.9)
LYMPHOCYTES # BLD AUTO: 1.5 10*3/MM3 (ref 0.7–3.1)
LYMPHOCYTES NFR BLD AUTO: 21.9 % (ref 19.6–45.3)
MCH RBC QN AUTO: 32.2 PG (ref 26.6–33)
MCHC RBC AUTO-ENTMCNC: 33.8 G/DL (ref 31.5–35.7)
MCV RBC AUTO: 95.3 FL (ref 79–97)
MONOCYTES # BLD AUTO: 0.7 10*3/MM3 (ref 0.1–0.9)
MONOCYTES NFR BLD AUTO: 10.3 % (ref 5–12)
NEUTROPHILS NFR BLD AUTO: 4.4 10*3/MM3 (ref 1.7–7)
NEUTROPHILS NFR BLD AUTO: 64.9 % (ref 42.7–76)
NRBC BLD AUTO-RTO: 0.1 /100 WBC (ref 0–0.2)
PLATELET # BLD AUTO: 129 10*3/MM3 (ref 140–450)
PMV BLD AUTO: 7.4 FL (ref 6–12)
POTASSIUM SERPL-SCNC: 3.3 MMOL/L (ref 3.5–5.2)
RBC # BLD AUTO: 4.2 10*6/MM3 (ref 3.77–5.28)
SODIUM SERPL-SCNC: 140 MMOL/L (ref 136–145)
WBC # BLD AUTO: 6.8 10*3/MM3 (ref 3.4–10.8)

## 2020-12-03 PROCEDURE — C1713 ANCHOR/SCREW BN/BN,TIS/BN: HCPCS | Performed by: ORTHOPAEDIC SURGERY

## 2020-12-03 PROCEDURE — 85025 COMPLETE CBC W/AUTO DIFF WBC: CPT | Performed by: NURSE PRACTITIONER

## 2020-12-03 PROCEDURE — G0378 HOSPITAL OBSERVATION PER HR: HCPCS

## 2020-12-03 PROCEDURE — 73030 X-RAY EXAM OF SHOULDER: CPT

## 2020-12-03 PROCEDURE — 25010000002 MIDAZOLAM PER 1 MG: Performed by: ANESTHESIOLOGY

## 2020-12-03 PROCEDURE — 25010000002 PHENYLEPHRINE 10 MG/ML SOLUTION 5 ML VIAL: Performed by: ANESTHESIOLOGY

## 2020-12-03 PROCEDURE — 96376 TX/PRO/DX INJ SAME DRUG ADON: CPT

## 2020-12-03 PROCEDURE — 83036 HEMOGLOBIN GLYCOSYLATED A1C: CPT | Performed by: NURSE PRACTITIONER

## 2020-12-03 PROCEDURE — 80048 BASIC METABOLIC PNL TOTAL CA: CPT | Performed by: NURSE PRACTITIONER

## 2020-12-03 PROCEDURE — 25010000002 ONDANSETRON PER 1 MG: Performed by: ORTHOPAEDIC SURGERY

## 2020-12-03 PROCEDURE — 25010000002 ROPIVACAINE PER 1 MG: Performed by: ANESTHESIOLOGY

## 2020-12-03 PROCEDURE — 25010000002 MORPHINE PER 10 MG: Performed by: NURSE PRACTITIONER

## 2020-12-03 PROCEDURE — 99226 PR SBSQ OBSERVATION CARE/DAY 35 MINUTES: CPT | Performed by: INTERNAL MEDICINE

## 2020-12-03 PROCEDURE — 82962 GLUCOSE BLOOD TEST: CPT

## 2020-12-03 PROCEDURE — 25010000002 DEXAMETHASONE PER 1 MG: Performed by: ANESTHESIOLOGY

## 2020-12-03 PROCEDURE — 76942 ECHO GUIDE FOR BIOPSY: CPT | Performed by: ORTHOPAEDIC SURGERY

## 2020-12-03 PROCEDURE — 25010000003 POTASSIUM CHLORIDE 10 MEQ/100ML SOLUTION: Performed by: ANESTHESIOLOGY

## 2020-12-03 PROCEDURE — 25010000002 FENTANYL CITRATE (PF) 100 MCG/2ML SOLUTION: Performed by: ANESTHESIOLOGY

## 2020-12-03 PROCEDURE — 25010000002 PROPOFOL 10 MG/ML EMULSION: Performed by: NURSE ANESTHETIST, CERTIFIED REGISTERED

## 2020-12-03 PROCEDURE — 25010000002 DEXAMETHASONE PER 1 MG: Performed by: NURSE ANESTHETIST, CERTIFIED REGISTERED

## 2020-12-03 PROCEDURE — 25010000003 POTASSIUM CHLORIDE 10 MEQ/100ML SOLUTION: Performed by: ORTHOPAEDIC SURGERY

## 2020-12-03 PROCEDURE — 25010000003 POTASSIUM CHLORIDE 10 MEQ/100ML SOLUTION: Performed by: NURSE PRACTITIONER

## 2020-12-03 PROCEDURE — 76000 FLUOROSCOPY <1 HR PHYS/QHP: CPT

## 2020-12-03 PROCEDURE — 25010000002 ONDANSETRON PER 1 MG: Performed by: NURSE PRACTITIONER

## 2020-12-03 DEVICE — LOCKING SCREW
Type: IMPLANTABLE DEVICE | Site: SHOULDER | Status: FUNCTIONAL
Brand: AXSOS

## 2020-12-03 DEVICE — CORTEX SCREW
Type: IMPLANTABLE DEVICE | Site: SHOULDER | Status: FUNCTIONAL
Brand: AXSOS

## 2020-12-03 DEVICE — SUT FW #2 W/TPR NDL 1/2 CIR 38IN 97CM 26.5MM BLU: Type: IMPLANTABLE DEVICE | Site: SHOULDER | Status: FUNCTIONAL

## 2020-12-03 DEVICE — PROXIMAL LATERAL HUMERUS PLATE, LEFT
Type: IMPLANTABLE DEVICE | Site: SHOULDER | Status: FUNCTIONAL
Brand: AXSOS

## 2020-12-03 DEVICE — SUT FW 2 REV CUT 38IN AR7202: Type: IMPLANTABLE DEVICE | Site: SHOULDER | Status: FUNCTIONAL

## 2020-12-03 RX ORDER — POTASSIUM CHLORIDE 7.45 MG/ML
10 INJECTION INTRAVENOUS
Status: DISCONTINUED | OUTPATIENT
Start: 2020-12-03 | End: 2020-12-04 | Stop reason: HOSPADM

## 2020-12-03 RX ORDER — ACETAMINOPHEN 325 MG/1
650 TABLET ORAL EVERY 4 HOURS PRN
Status: DISCONTINUED | OUTPATIENT
Start: 2020-12-03 | End: 2020-12-04 | Stop reason: HOSPADM

## 2020-12-03 RX ORDER — FENTANYL CITRATE 50 UG/ML
INJECTION, SOLUTION INTRAMUSCULAR; INTRAVENOUS
Status: COMPLETED | OUTPATIENT
Start: 2020-12-03 | End: 2020-12-03

## 2020-12-03 RX ORDER — OXYCODONE HYDROCHLORIDE 5 MG/1
10 TABLET ORAL EVERY 4 HOURS PRN
Status: DISCONTINUED | OUTPATIENT
Start: 2020-12-03 | End: 2020-12-04 | Stop reason: HOSPADM

## 2020-12-03 RX ORDER — MORPHINE SULFATE 4 MG/ML
6 INJECTION, SOLUTION INTRAMUSCULAR; INTRAVENOUS
Status: DISCONTINUED | OUTPATIENT
Start: 2020-12-03 | End: 2020-12-04 | Stop reason: HOSPADM

## 2020-12-03 RX ORDER — POTASSIUM CHLORIDE 1.5 G/1.77G
40 POWDER, FOR SOLUTION ORAL AS NEEDED
Status: DISCONTINUED | OUTPATIENT
Start: 2020-12-03 | End: 2020-12-04 | Stop reason: HOSPADM

## 2020-12-03 RX ORDER — ACETAMINOPHEN 650 MG/1
650 SUPPOSITORY RECTAL EVERY 4 HOURS PRN
Status: DISCONTINUED | OUTPATIENT
Start: 2020-12-03 | End: 2020-12-04 | Stop reason: HOSPADM

## 2020-12-03 RX ORDER — PHENYLEPHRINE HCL IN 0.9% NACL 0.5 MG/5ML
SYRINGE (ML) INTRAVENOUS AS NEEDED
Status: DISCONTINUED | OUTPATIENT
Start: 2020-12-03 | End: 2020-12-03 | Stop reason: SURG

## 2020-12-03 RX ORDER — DEXAMETHASONE SODIUM PHOSPHATE 4 MG/ML
INJECTION, SOLUTION INTRA-ARTICULAR; INTRALESIONAL; INTRAMUSCULAR; INTRAVENOUS; SOFT TISSUE AS NEEDED
Status: DISCONTINUED | OUTPATIENT
Start: 2020-12-03 | End: 2020-12-03 | Stop reason: SURG

## 2020-12-03 RX ORDER — CEFAZOLIN SODIUM IN 0.9 % NACL 3 G/100 ML
3 INTRAVENOUS SOLUTION, PIGGYBACK (ML) INTRAVENOUS ONCE
Status: DISCONTINUED | OUTPATIENT
Start: 2020-12-03 | End: 2020-12-03

## 2020-12-03 RX ORDER — LIDOCAINE HYDROCHLORIDE AND EPINEPHRINE 5; 5 MG/ML; UG/ML
INJECTION, SOLUTION INFILTRATION; PERINEURAL AS NEEDED
Status: DISCONTINUED | OUTPATIENT
Start: 2020-12-03 | End: 2020-12-03 | Stop reason: HOSPADM

## 2020-12-03 RX ORDER — POTASSIUM CHLORIDE 20 MEQ/1
40 TABLET, EXTENDED RELEASE ORAL AS NEEDED
Status: DISCONTINUED | OUTPATIENT
Start: 2020-12-03 | End: 2020-12-04 | Stop reason: HOSPADM

## 2020-12-03 RX ORDER — ROPIVACAINE HYDROCHLORIDE 5 MG/ML
INJECTION, SOLUTION EPIDURAL; INFILTRATION; PERINEURAL
Status: COMPLETED | OUTPATIENT
Start: 2020-12-03 | End: 2020-12-03

## 2020-12-03 RX ORDER — NALOXONE HCL 0.4 MG/ML
0.4 VIAL (ML) INJECTION AS NEEDED
Status: DISCONTINUED | OUTPATIENT
Start: 2020-12-03 | End: 2020-12-03 | Stop reason: HOSPADM

## 2020-12-03 RX ORDER — NEOSTIGMINE METHYLSULFATE 5 MG/5 ML
SYRINGE (ML) INTRAVENOUS AS NEEDED
Status: DISCONTINUED | OUTPATIENT
Start: 2020-12-03 | End: 2020-12-03 | Stop reason: SURG

## 2020-12-03 RX ORDER — MELOXICAM 15 MG/1
15 TABLET ORAL DAILY
Status: DISCONTINUED | OUTPATIENT
Start: 2020-12-03 | End: 2020-12-04 | Stop reason: HOSPADM

## 2020-12-03 RX ORDER — PROMETHAZINE HYDROCHLORIDE 12.5 MG/1
12.5 TABLET ORAL EVERY 6 HOURS PRN
Status: DISCONTINUED | OUTPATIENT
Start: 2020-12-03 | End: 2020-12-04 | Stop reason: HOSPADM

## 2020-12-03 RX ORDER — TRANEXAMIC ACID 100 MG/ML
INJECTION, SOLUTION INTRAVENOUS AS NEEDED
Status: DISCONTINUED | OUTPATIENT
Start: 2020-12-03 | End: 2020-12-03 | Stop reason: SURG

## 2020-12-03 RX ORDER — POTASSIUM CHLORIDE 7.45 MG/ML
10 INJECTION INTRAVENOUS
Status: DISCONTINUED | OUTPATIENT
Start: 2020-12-03 | End: 2020-12-03

## 2020-12-03 RX ORDER — ONDANSETRON 4 MG/1
4 TABLET, FILM COATED ORAL EVERY 6 HOURS PRN
Status: DISCONTINUED | OUTPATIENT
Start: 2020-12-03 | End: 2020-12-04 | Stop reason: HOSPADM

## 2020-12-03 RX ORDER — LIDOCAINE HYDROCHLORIDE 10 MG/ML
INJECTION, SOLUTION EPIDURAL; INFILTRATION; INTRACAUDAL; PERINEURAL AS NEEDED
Status: DISCONTINUED | OUTPATIENT
Start: 2020-12-03 | End: 2020-12-03 | Stop reason: SURG

## 2020-12-03 RX ORDER — ROCURONIUM BROMIDE 10 MG/ML
INJECTION, SOLUTION INTRAVENOUS AS NEEDED
Status: DISCONTINUED | OUTPATIENT
Start: 2020-12-03 | End: 2020-12-03 | Stop reason: SURG

## 2020-12-03 RX ORDER — SODIUM CHLORIDE 9 MG/ML
125 INJECTION, SOLUTION INTRAVENOUS CONTINUOUS
Status: DISPENSED | OUTPATIENT
Start: 2020-12-03 | End: 2020-12-04

## 2020-12-03 RX ORDER — HYDROMORPHONE HCL 110MG/55ML
0.2 PATIENT CONTROLLED ANALGESIA SYRINGE INTRAVENOUS
Status: DISCONTINUED | OUTPATIENT
Start: 2020-12-03 | End: 2020-12-03 | Stop reason: HOSPADM

## 2020-12-03 RX ORDER — GLYCOPYRROLATE 1 MG/5 ML
SYRINGE (ML) INTRAVENOUS AS NEEDED
Status: DISCONTINUED | OUTPATIENT
Start: 2020-12-03 | End: 2020-12-03 | Stop reason: SURG

## 2020-12-03 RX ORDER — NALOXONE HCL 0.4 MG/ML
0.4 VIAL (ML) INJECTION
Status: DISCONTINUED | OUTPATIENT
Start: 2020-12-03 | End: 2020-12-04 | Stop reason: HOSPADM

## 2020-12-03 RX ORDER — DEXAMETHASONE SODIUM PHOSPHATE 4 MG/ML
INJECTION, SOLUTION INTRA-ARTICULAR; INTRALESIONAL; INTRAMUSCULAR; INTRAVENOUS; SOFT TISSUE
Status: COMPLETED | OUTPATIENT
Start: 2020-12-03 | End: 2020-12-03

## 2020-12-03 RX ORDER — PROPOFOL 10 MG/ML
VIAL (ML) INTRAVENOUS AS NEEDED
Status: DISCONTINUED | OUTPATIENT
Start: 2020-12-03 | End: 2020-12-03 | Stop reason: SURG

## 2020-12-03 RX ORDER — ONDANSETRON 2 MG/ML
4 INJECTION INTRAMUSCULAR; INTRAVENOUS ONCE AS NEEDED
Status: DISCONTINUED | OUTPATIENT
Start: 2020-12-03 | End: 2020-12-03 | Stop reason: HOSPADM

## 2020-12-03 RX ORDER — MIDAZOLAM HYDROCHLORIDE 1 MG/ML
INJECTION INTRAMUSCULAR; INTRAVENOUS
Status: COMPLETED | OUTPATIENT
Start: 2020-12-03 | End: 2020-12-03

## 2020-12-03 RX ORDER — HYDROMORPHONE HCL 110MG/55ML
0.5 PATIENT CONTROLLED ANALGESIA SYRINGE INTRAVENOUS
Status: DISCONTINUED | OUTPATIENT
Start: 2020-12-03 | End: 2020-12-03 | Stop reason: HOSPADM

## 2020-12-03 RX ORDER — SODIUM CHLORIDE 9 MG/ML
50 INJECTION, SOLUTION INTRAVENOUS CONTINUOUS
Status: DISCONTINUED | OUTPATIENT
Start: 2020-12-03 | End: 2020-12-03 | Stop reason: HOSPADM

## 2020-12-03 RX ORDER — HYDROCODONE BITARTRATE AND ACETAMINOPHEN 7.5; 325 MG/1; MG/1
1 TABLET ORAL EVERY 4 HOURS PRN
Status: DISCONTINUED | OUTPATIENT
Start: 2020-12-03 | End: 2020-12-04 | Stop reason: HOSPADM

## 2020-12-03 RX ORDER — ONDANSETRON 2 MG/ML
4 INJECTION INTRAMUSCULAR; INTRAVENOUS EVERY 6 HOURS PRN
Status: DISCONTINUED | OUTPATIENT
Start: 2020-12-03 | End: 2020-12-04 | Stop reason: HOSPADM

## 2020-12-03 RX ADMIN — Medication 1 MG: at 16:23

## 2020-12-03 RX ADMIN — Medication 0.2 MG: at 16:23

## 2020-12-03 RX ADMIN — ONDANSETRON 4 MG: 2 INJECTION INTRAMUSCULAR; INTRAVENOUS at 16:21

## 2020-12-03 RX ADMIN — DEXAMETHASONE SODIUM PHOSPHATE 4 MG: 4 INJECTION, SOLUTION INTRAMUSCULAR; INTRAVENOUS at 13:21

## 2020-12-03 RX ADMIN — Medication 3 ML: at 00:34

## 2020-12-03 RX ADMIN — PHENYLEPHRINE HYDROCHLORIDE 0.5 MCG/KG/MIN: 10 INJECTION INTRAVENOUS at 16:02

## 2020-12-03 RX ADMIN — LIDOCAINE HYDROCHLORIDE 100 MG: 10 INJECTION, SOLUTION EPIDURAL; INFILTRATION; INTRACAUDAL; PERINEURAL at 15:03

## 2020-12-03 RX ADMIN — MORPHINE SULFATE 4 MG: 4 INJECTION INTRAVENOUS at 07:56

## 2020-12-03 RX ADMIN — GABAPENTIN 900 MG: 300 CAPSULE ORAL at 20:30

## 2020-12-03 RX ADMIN — MELOXICAM 15 MG: 15 TABLET ORAL at 20:30

## 2020-12-03 RX ADMIN — TOPIRAMATE 100 MG: 100 TABLET, FILM COATED ORAL at 20:30

## 2020-12-03 RX ADMIN — ROPIVACAINE HYDROCHLORIDE 20 ML: 5 INJECTION, SOLUTION EPIDURAL; INFILTRATION; PERINEURAL at 13:21

## 2020-12-03 RX ADMIN — POTASSIUM CHLORIDE 10 MEQ: 7.46 INJECTION, SOLUTION INTRAVENOUS at 16:03

## 2020-12-03 RX ADMIN — SODIUM CHLORIDE: 0.9 INJECTION, SOLUTION INTRAVENOUS at 14:55

## 2020-12-03 RX ADMIN — GABAPENTIN 900 MG: 300 CAPSULE ORAL at 00:05

## 2020-12-03 RX ADMIN — FENTANYL CITRATE 50 MCG: 50 INJECTION, SOLUTION INTRAMUSCULAR; INTRAVENOUS at 15:57

## 2020-12-03 RX ADMIN — CEFAZOLIN SODIUM 2 G: 1 INJECTION, POWDER, FOR SOLUTION INTRAMUSCULAR; INTRAVENOUS at 15:05

## 2020-12-03 RX ADMIN — SODIUM CHLORIDE 125 ML/HR: 9 INJECTION, SOLUTION INTRAVENOUS at 20:27

## 2020-12-03 RX ADMIN — PHENYLEPHRINE HYDROCHLORIDE 200 MCG: 10 INJECTION INTRAVENOUS at 15:41

## 2020-12-03 RX ADMIN — TRANEXAMIC ACID 1000 MG: 100 INJECTION, SOLUTION INTRAVENOUS at 15:17

## 2020-12-03 RX ADMIN — CEFAZOLIN SODIUM 2 G: 10 INJECTION, POWDER, FOR SOLUTION INTRAVENOUS at 22:19

## 2020-12-03 RX ADMIN — POTASSIUM CHLORIDE 10 MEQ: 7.46 INJECTION, SOLUTION INTRAVENOUS at 13:56

## 2020-12-03 RX ADMIN — FENTANYL CITRATE 50 MCG: 50 INJECTION, SOLUTION INTRAMUSCULAR; INTRAVENOUS at 15:03

## 2020-12-03 RX ADMIN — PHENYLEPHRINE HYDROCHLORIDE 0.5 MCG/KG/MIN: 10 INJECTION INTRAVENOUS at 16:15

## 2020-12-03 RX ADMIN — MIDAZOLAM 2 MG: 1 INJECTION INTRAMUSCULAR; INTRAVENOUS at 13:21

## 2020-12-03 RX ADMIN — Medication 10 ML: at 20:27

## 2020-12-03 RX ADMIN — ONDANSETRON 4 MG: 2 INJECTION, SOLUTION INTRAMUSCULAR; INTRAVENOUS at 20:47

## 2020-12-03 RX ADMIN — PHENYLEPHRINE HYDROCHLORIDE 100 MCG: 10 INJECTION INTRAVENOUS at 16:01

## 2020-12-03 RX ADMIN — ROCURONIUM BROMIDE 50 MG: 10 INJECTION, SOLUTION INTRAVENOUS at 15:03

## 2020-12-03 RX ADMIN — DEXAMETHASONE SODIUM PHOSPHATE 4 MG: 4 INJECTION, SOLUTION INTRAMUSCULAR; INTRAVENOUS at 15:23

## 2020-12-03 RX ADMIN — MORPHINE SULFATE 4 MG: 4 INJECTION INTRAVENOUS at 00:04

## 2020-12-03 RX ADMIN — PROPOFOL 150 MG: 10 INJECTION, EMULSION INTRAVENOUS at 15:03

## 2020-12-03 RX ADMIN — PHENYLEPHRINE HYDROCHLORIDE 100 MCG: 10 INJECTION INTRAVENOUS at 15:28

## 2020-12-03 RX ADMIN — TOPIRAMATE 100 MG: 100 TABLET, FILM COATED ORAL at 00:32

## 2020-12-03 RX ADMIN — DEXTROSE AND SODIUM CHLORIDE 50 ML/HR: 5; 450 INJECTION, SOLUTION INTRAVENOUS at 00:28

## 2020-12-03 RX ADMIN — FENTANYL CITRATE 50 MCG: 50 INJECTION, SOLUTION INTRAMUSCULAR; INTRAVENOUS at 16:02

## 2020-12-03 RX ADMIN — FENTANYL CITRATE 50 MCG: 50 INJECTION, SOLUTION INTRAMUSCULAR; INTRAVENOUS at 13:21

## 2020-12-03 NOTE — ANESTHESIA PROCEDURE NOTES
Airway  Urgency: elective    Date/Time: 12/3/2020 3:07 PM  Airway not difficult    General Information and Staff    Patient location during procedure: OR  Anesthesiologist: Nestor Angel MD  CRNA: Isidro Rose CRNA    Indications and Patient Condition  Indications for airway management: airway protection    Preoxygenated: yes  MILS not maintained throughout  Mask difficulty assessment: 1 - vent by mask    Final Airway Details  Final airway type: endotracheal airway      Successful airway: ETT  Cuffed: yes   Successful intubation technique: direct laryngoscopy  Facilitating devices/methods: intubating stylet  Endotracheal tube insertion site: oral  Blade: Frandy  Blade size: 4  ETT size (mm): 7.0  Cormack-Lehane Classification: grade I - full view of glottis  Placement verified by: chest auscultation and capnometry   Measured from: lips  ETT/EBT  to lips (cm): 22  Number of attempts at approach: 1  Assessment: lips, teeth, and gum same as pre-op and atraumatic intubation

## 2020-12-03 NOTE — H&P
"      [facility] Medicine Services      Patient Name: vEe Skelton  : 1960  MRN: [id number]  Primary Care Physician: Yvan Calderon MD  Date of admission: 2020    Patient Care Team:  Yvan Calderon MD as PCP - General          Subjective   History Present Illness     Chief Complaint:   Chief Complaint   Patient presents with   • Fall     fall, left arm pain, pt [facility].sent in by CONSTANTIN Calderon.                   60 year old female BMI 41 with multiple medical issues presents after seeing her PCP after a fall on ice today. She had an XR of her right arm at [facility] which showed left humeral head and neck fracture. She reports simply a mechanical fall on ice. She denies hitting her head. Review of records shows multiple fractures ; left wrist and left olecranon post falls in the past. Labs are basically unremarkable. CXR per radiology today was done for pre-op :  \"1.Mild opacities in the left lung base and perihilar left upper lobe.  Findings are nonspecific but could indicate multifocal pneumonia.  2.Radiographic follow-up is recommended to ensure resolution.\"    Covid-19 was negative, she is afebrile and denies any fever, cough or congestion. PMH of diabetes mellitus Type 2, anxiety , Breast cancer, RLS and hypertension. Dr Echols of orthopedics has been consulted and she will be admitted for further evaluation and treatment with planned ORIF  Tomorrow per ortho.       Review of Systems   Constitution: Negative.   HENT: Negative.    Eyes: Negative.    Cardiovascular: Negative.    Respiratory: Negative.    Endocrine: Negative.    Hematologic/Lymphatic: Negative.    Skin: Negative.    Musculoskeletal: Positive for joint pain, joint swelling and myalgias.   Gastrointestinal: Negative.    Genitourinary: Negative.    Neurological: Negative.    Psychiatric/Behavioral: The patient is nervous/anxious.    Allergic/Immunologic: Negative.            Personal " History     Past Medical History:   Past Medical History:   Diagnosis Date   • Anxiety    • Arthritis    • Breast cancer (CMS/HCC)    • Diabetes (CMS/HCC)    • Elevated cholesterol    • GERD (gastroesophageal reflux disease)    • Headache    • History of transfusion    • HTN (hypertension)    • RLS (restless legs syndrome)    • Sleep apnea        Surgical History:      Past Surgical History:   Procedure Laterality Date   • BREAST BIOPSY     • BREAST CYST ASPIRATION     • BREAST CYST EXCISION     • BREAST LUMPECTOMY             Family History: family history includes Breast cancer in her brother, father, and sister; No Known Problems in her daughter, maternal aunt, maternal grandmother, mother, paternal aunt, paternal grandmother, and son. Otherwise pertinent FHx was reviewed and unremarkable.     Social History:  reports that she has been smoking cigarettes. She has been smoking about 0.50 packs per day. She has never used smokeless tobacco. She reports current alcohol use of about 1.0 standard drinks of alcohol per week. She reports that she does not use drugs.      Medications:  Prior to Admission medications    Medication Sig Start Date End Date Taking? Authorizing Provider   amLODIPine-Valsartan-HCTZ -25 MG tablet AMLODIPINE-VALSARTAN-HCTZ -25 MG TABS 1/23/17   Rigo Ornelas MD   aspirin 81 MG tablet Take 81 mg by mouth Daily.    Rigo Ornelas MD   buPROPion XL (WELLBUTRIN XL) 300 MG 24 hr tablet Take 1 tablet by mouth.    Rigo Ornelas MD   Butalbital-APAP-Caff-Cod -41-30 MG capsule 1 capsule Every 6 (Six) Hours. 1/22/18   Rigo Ornelas MD   cephalexin (KEFLEX) 250 MG capsule TK 1 C PO Q 6 HOURS UNTIL ALL TAKEN 10/28/19   Rigo Ornelas MD   Cholecalciferol (VITAMIN D-1000 MAX ST) 25 MCG (1000 UT) tablet Take 2,000 Units by mouth Daily.    Rigo Ornelas MD   Coenzyme Q10 (COQ-10) 100 MG capsule Take  by mouth.    Rigo Ornelas MD      cyclobenzaprine (FLEXERIL) 10 MG tablet CYCLOBENZAPRINE HCL 10 MG TABS 2/4/16   Rigo Ornelas MD   doxycycline (VIBRAMYCIN) 100 MG capsule TK 1 C PO BID 12/17/19   Rigo Ornelas MD   econazole nitrate (SPECTAZOLE) 1 % cream  1/16/20   Rigo Ornelas MD   esomeprazole (nexIUM) 40 MG capsule Take 1 capsule by mouth.    Rigo Ornelas MD   gabapentin (NEURONTIN) 300 MG capsule Take 300 mg by mouth 3 (Three) Times a Day. 1/21/19   Rigo Ornelas MD   glycopyrrolate (ROBINUL) 1 MG tablet     Rigo Ornelas MD   hydroCHLOROthiazide (HYDRODIURIL) 25 MG tablet  10/29/19   Rigo Ornelas MD   HYDROcodone-acetaminophen (NORCO) 5-325 MG per tablet TK 1 T PO Q 6 HOURS PRN P 10/28/19   iRgo Ornelas MD   L-Methylfolate-Algae 15-90.314 MG capsule TK 1 C PO QAM 12/27/19   Rigo Ornelas MD   L-Methylfolate-B6-B12 3-35-2 MG tablet Take  by mouth.    Rigo Ornelas MD   LORazepam (ATIVAN) 0.5 MG tablet ATIVAN 0.5 MG TABS 1/22/18   Rigo Ornelas MD   LORazepam (ATIVAN) 1 MG tablet TAKE 1 TABLET BY MOUTH D PRN FOR ANXIETY 1/14/20   Rigo Ornelas MD   Lurasidone HCl (LATUDA) 20 MG tablet tablet Take 1 tablet by mouth.    Rigo Ornelas MD   meloxicam (MOBIC) 15 MG tablet Take 15 mg by mouth Daily. 11/21/18   Rigo Ornelas MD   metFORMIN ER (GLUCOPHAGE-XR) 500 MG 24 hr tablet Take 500 mg by mouth. 12/27/18   Rigo Ornelas MD   metoprolol succinate XL (TOPROL XL) 100 MG 24 hr tablet Take 1 tablet by mouth.    Rigo Ornelas MD   mupirocin (BACTROBAN) 2 % ointment  12/17/19   Rigo Ornelas MD   NYSTATIN 635733 UNIT/GM powder  11/16/19   Rigo Ornelas MD   Omega-3 Fatty Acids (FISH OIL) 1000 MG capsule capsule Take  by mouth.    Rigo Ornelas MD   pravastatin (PRAVACHOL) 40 MG tablet  10/27/19   Rigo Ornelas MD   SSD 1 % cream APPLY TOPICALLY TO THE AFFECTED AREA ON THE SKIN BID UNTIL  HEALED 1/20/20   Rigo Ornelas MD   topiramate (TOPAMAX) 100 MG tablet  1/16/20   Rigo Ornelas MD   topiramate (TOPAMAX) 50 MG tablet  11/14/19   Rigo Ornelas MD   traMADol (ULTRAM) 50 MG tablet Every 6 (Six) Hours. 2/4/16   Rigo Ornelas MD   trimethoprim-polymyxin b (POLYTRIM) 12980-2.1 UNIT/ML-% ophthalmic solution Apply  to eye(s) as directed by provider Every 4 (Four) Hours.    Rigo Ornelas MD   Turmeric 500 MG tablet Take  by mouth.    Rigo Ornelas MD   valACYclovir (VALTREX) 1000 MG tablet VALTREX 1 GM TABS 8/21/12   Rigo Ornelas MD   valsartan (DIOVAN) 320 MG tablet  11/5/19   Rigo Ornelas MD   vilazodone (VIIBRYD) 40 MG tablet tablet Take 40 mg by mouth Daily. 12/24/18   Rigo Ornelas MD       Allergies:    Allergies   Allergen Reactions   • Vancomycin Other (See Comments)     Ringing of ears        Objective   Objective     Vital Signs  Temp:  [98.2 °F (36.8 °C)-98.5 °F (36.9 °C)] 98.2 °F (36.8 °C)  Heart Rate:  [84-87] 84  Resp:  [19-20] 19  BP: (133-157)/(88-89) 133/88  SpO2:  [96 %-98 %] 96 %  on   ;   Device (Oxygen Therapy): room air  Body mass index is 41.59 kg/m².    Physical Exam  Vitals signs reviewed.   Constitutional:       Appearance: Normal appearance. She is obese.   HENT:      Head: Normocephalic and atraumatic.      Right Ear: External ear normal.      Left Ear: External ear normal.      Nose: Nose normal.      Mouth/Throat:      Mouth: Mucous membranes are moist.   Eyes:      Extraocular Movements: Extraocular movements intact.   Neck:      Musculoskeletal: Normal range of motion and neck supple.   Cardiovascular:      Rate and Rhythm: Normal rate and regular rhythm.      Pulses: Normal pulses.      Heart sounds: Normal heart sounds.   Pulmonary:      Effort: Pulmonary effort is normal.      Breath sounds: Normal breath sounds.   Abdominal:      Palpations: Abdomen is soft.   Genitourinary:     Comments:  deferred  Musculoskeletal:         General: Swelling, tenderness and signs of injury present.      Comments: RUE   Skin:     General: Skin is warm and dry.   Neurological:      General: No focal deficit present.      Mental Status: She is alert and oriented to person, place, and time.   Psychiatric:         Mood and Affect: Mood normal.         Behavior: Behavior normal.         Thought Content: Thought content normal.         Judgment: Judgment normal.         Results Review:  I have personally reviewed most recent radiology images and interpretations and agree with findings, most notably: reported to me verbally by ED Provider. .    Results from last 7 days   Lab Units 12/02/20 1939   WBC 10*3/mm3 9.80   HEMOGLOBIN g/dL 14.5   HEMATOCRIT % 42.1   PLATELETS 10*3/mm3 150   INR  1.00     Results from last 7 days   Lab Units 12/02/20 1939   SODIUM mmol/L 140   POTASSIUM mmol/L 3.3*   CHLORIDE mmol/L 104   CO2 mmol/L 24.0   BUN mg/dL 16   CREATININE mg/dL 0.68   GLUCOSE mg/dL 117*   CALCIUM mg/dL 9.8     Estimated Creatinine Clearance: 104.3 mL/min (by C-G formula based on SCr of 0.68 mg/dL).  Brief Urine Lab Results     None          Microbiology Results (last 10 days)     Procedure Component Value - Date/Time    COVID PRE-OP / PRE-PROCEDURE SCREENING ORDER (NO ISOLATION) - Swab, Nasopharynx [[id number]]  (Normal) Collected: 12/02/20 2015    Lab Status: Final result Specimen: Swab from Nasopharynx Updated: 12/02/20 2102    Narrative:      The following orders were created for panel order COVID PRE-OP / PRE-PROCEDURE SCREENING ORDER (NO ISOLATION) - Swab, Nasopharynx.  Procedure                               Abnormality         Status                     ---------                               -----------         ------                     COVID-19,CEPHEID,COR/TEO...[[id number]]  Normal              Final result                 Please view results for these tests on the individual orders.     COVID-19,CEPHEID,COR/TEO/PAD IN-HOUSE(OR EMERGENT/ADD-ON),NP SWAB IN TRANSPORT MEDIA 3-4 HR TAT - Swab, Nasopharynx [[id number]]  (Normal) Collected: 12/02/20 2015    Lab Status: Final result Specimen: Swab from Nasopharynx Updated: 12/02/20 2102     COVID19 Not Detected    Narrative:      Fact sheet for providers: https://www.fda.gov/media/206682/download     Fact sheet for patients: https://www.fda.gov/media/267714/download          ECG/EMG Results (most recent)     Procedure Component Value Units Date/Time    ECG 12 Lead [[id number]] Collected: 12/02/20 1911     Updated: 12/02/20 1913     QT Interval 367 ms     Narrative:      HEART RATE= 82  bpm  RR Interval= 732  ms  ND Interval= 166  ms  P Horizontal Axis= -21  deg  P Front Axis= 19  deg  QRSD Interval= 91  ms  QT Interval= 367  ms  QRS Axis= -25  deg  T Wave Axis= 9  deg  - ABNORMAL ECG -  Sinus rhythm  Anterior Q waves, possibly due to LVH  No previous ECG available for comparison  Electronically Signed By:   Date and Time of Study: 2020-12-02 19:11:19                    Xr Chest 1 View    Result Date: 12/2/2020  1.Mild opacities in the left lung base and perihilar left upper lobe. Findings are nonspecific but could indicate multifocal pneumonia. 2.Radiographic follow-up is recommended to ensure resolution.  Electronically Signed By-Kee Armstrong MD On:12/2/2020 8:06 PM This report was finalized on [id number] by  Kee Armstrong MD.        Estimated Creatinine Clearance: 104.3 mL/min (by C-G formula based on SCr of 0.68 mg/dL).    Assessment/Plan   Assessment/Plan       Active Hospital Problems    Diagnosis  POA   • Fall [W19.XXXA]  Yes   • Closed fracture of humerus [S42.309A]  Yes      Resolved Hospital Problems   No resolved problems to display.     Closed fracture of humeral head and neck per outside XR post mechanical fall on ice, Dr Schmitz of orthopedics consulted, NPO for ORIF, IVF, IV morphine for pain , antiemetics, hold aspirin and Meloxicam , PT  eval and treat for frequent falls     CXR shows possible multifocal pneumonia but afebrile , wbc normal and no complaints cough, fever, congestion or dyspnea, continue to monitor , no antibiotics indicated at this time     Mild hypokalemia K 3.3 K replacement protocol     DM type 2 hold metformin while inpatient add ssi prn and accuchecks achs     Anxiety - on Wellbutrin and Vibryd, lorazepam  20 per INSPECT unless not taking every day , not ordered at this time due to fall    HTN- controlled on HCTZ and Valsartan     HLD- on statin     GERD- on PPI     Migraine prophylaxis - on Topamax    RLS- on high dose gabapentin ( INSPECT verified)     Diet supp on Vit D hold Ca no Tumeric in formulary     Obesity Morbid BMI 41- lifestyle management education         VTE Prophylaxis -   Mechanical Order History:      Ordered        Signed and Held  Place Sequential Compression Device  Once         Signed and Held  Maintain Sequential Compression Device  Continuous         Signed and Held  Place Sequential Compression Device  Once         Signed and Held  Maintain Sequential Compression Device  Continuous                 Pharmalogical Order History:     None          CODE STATUS:    Code Status and Medical Interventions:   Ordered at: 20 1954     Code Status:    CPR     Medical Interventions (Level of Support Prior to Arrest):    Full       This patient has been examined wearing appropriate Personal Protective Equipment . 20      I discussed the patient's findings and my recommendations with patient.      Signature:Electronically signed by BRENNEN Gabriel, 20, 11:30 PM EST.    [facility]ist Team

## 2020-12-03 NOTE — ANESTHESIA PROCEDURE NOTES
Peripheral Block      Patient reassessed immediately prior to procedure    Patient location during procedure: pre-op  Reason for block: procedure for pain, at surgeon's request and post-op pain management  Performed by  Anesthesiologist: Nestor Angel MD  Preanesthetic Checklist  Completed: patient identified, site marked, surgical consent, pre-op evaluation, timeout performed, IV checked, risks and benefits discussed and monitors and equipment checked  Prep:  Pt Position: supine  Sterile barriers:gloves and sterile barriers  Prep: ChloraPrep  Patient monitoring: blood pressure monitoring, continuous pulse oximetry and EKG  Procedure  Sedation:yes  Performed under: PNB  Guidance:ultrasound guided  ULTRASOUND INTERPRETATION.  Using ultrasound guidance a 22 G and 20 G gauge needle was placed in close proximity to the brachial plexus nerve, at which point, under ultrasound guidance anesthetic was injected in the area of the nerve and spread of the anesthesia was seen on ultrasound in close proximity thereto.  There were no abnormalities seen on ultrasound; a digital image was taken; and the patient tolerated the procedure with no complications. Images:still images obtained, printed/placed on chart    Laterality:left  Block Type:interscalene  Injection Technique:single-shot  Needle Type:echogenic  Needle Gauge:20 G  Resistance on Injection: none  Sedation medications used: midazolam (VERSED) injection, 2 mg  fentaNYL citrate (PF) (SUBLIMAZE) injection, 50 mcg  Medications Used: dexamethasone (DECADRON) injection, 4 mg  ropivacaine (NAROPIN) 0.5 % injection, 20 mL      Post Assessment  Injection Assessment: negative aspiration for heme, no paresthesia on injection and incremental injection  Patient Tolerance:comfortable throughout block  Complications:no  Additional Notes  20 ml of 0.5% Ropivicaine plus Decadron 4 ml. No problem with block Block placed without problems

## 2020-12-03 NOTE — PLAN OF CARE
Goal Outcome Evaluation:  Plan of Care Reviewed With: patient  Progress: no change       Patient was admitted from the ER with a break at the right shoulder. She fell on the left side at home on ice on a board. She was seen by her MD who sent her to [facility] and was sent home. She said the pain became too bad that she came to the ER. Pain is tolerable with IV morphine she stated. She has a bedside commode and is an assist times 1 to it. She has been NPO at midnight due to possibility of surgery today. Will continue to monitor.

## 2020-12-03 NOTE — CONSULTS
Inpatient Orthopedic Surgery Consult  Consult performed by: Yvan Echols MD  Consult ordered by: Vianey Tyson APRN            Referring Provider: Emergency room  Reason for Consultation: Left proximal humerus fracture    Patient Care Team:  Yvan Calderon MD as PCP - General      Subjective .     History of present illness:  Eve Skelton is a 60 y.o. female who presents with fall last night on ice injuring her left shoulder.  No prior problems.  No numbness or tingling.  Pain is 8 on a scale 1-10.  No other injuries.  No chest pain shortness of breath fevers or chills..     Review of Systems:    No chest pain shortness of breath fevers chills nausea vomiting or rashes      History  Past Medical History:   Diagnosis Date   • Anxiety    • Arthritis    • Breast cancer (CMS/HCC)    • Diabetes (CMS/HCC)    • Elevated cholesterol    • GERD (gastroesophageal reflux disease)    • Headache    • History of transfusion    • HTN (hypertension)    • RLS (restless legs syndrome)    • Sleep apnea      Past Surgical History:   Procedure Laterality Date   • BREAST BIOPSY     • BREAST CYST ASPIRATION     • BREAST CYST EXCISION     • BREAST LUMPECTOMY       Family History   Problem Relation Age of Onset   • No Known Problems Mother    • Breast cancer Father    • Breast cancer Sister    • Breast cancer Brother    • No Known Problems Daughter    • No Known Problems Son    • No Known Problems Maternal Grandmother    • No Known Problems Paternal Grandmother    • No Known Problems Maternal Aunt    • No Known Problems Paternal Aunt       Social History     Tobacco Use   • Smoking status: Current Every Day Smoker     Packs/day: 0.50     Types: Cigarettes   • Smokeless tobacco: Never Used   Substance Use Topics   • Alcohol use: Yes     Alcohol/week: 1.0 standard drinks     Types: 1 Glasses of wine per week   • Drug use: Never     Medications Prior to Admission   Medication Sig Dispense Refill Last Dose   • aspirin 81 MG  tablet Take 81 mg by mouth Daily.      • buPROPion XL (WELLBUTRIN XL) 300 MG 24 hr tablet Take 1 tablet by mouth Every Morning.      • Ca Phosphate-Cholecalciferol (Calcium 500 + D3) 250-500 MG-UNIT chewable tablet Chew 1 tablet Daily.      • Cholecalciferol (VITAMIN D-1000 MAX ST) 25 MCG (1000 UT) tablet Take 2,000 Units by mouth Daily.      • gabapentin (NEURONTIN) 300 MG capsule Take 900 mg by mouth 2 (two) times a day. Morning and Night      • gabapentin (NEURONTIN) 300 MG capsule Take 600 mg by mouth Daily. Afternoon      • hydroCHLOROthiazide (HYDRODIURIL) 25 MG tablet Take 25 mg by mouth Daily.      • L-Methylfolate-Algae 15-90.314 MG capsule Take 1 capsule by mouth Daily.      • LORazepam (ATIVAN) 1 MG tablet Take 1 mg by mouth Daily As Needed for Anxiety.      • meloxicam (MOBIC) 15 MG tablet Take 15 mg by mouth Daily.      • metFORMIN ER (GLUCOPHAGE-XR) 500 MG 24 hr tablet Take 500 mg by mouth.      • Omega-3 Fatty Acids (FISH OIL) 1000 MG capsule capsule Take 1,000 mg by mouth 2 (Two) Times a Day With Meals.      • omeprazole (priLOSEC) 20 MG capsule Take 20 mg by mouth Daily.      • polyethyl glycol-propyl glycol (SYSTANE) 0.4-0.3 % solution ophthalmic solution Administer 2 drops to both eyes Every 1 (One) Hour As Needed.      • pravastatin (PRAVACHOL) 40 MG tablet Take 40 mg by mouth Every Night.      • topiramate (TOPAMAX) 100 MG tablet Take 100 mg by mouth 2 (Two) Times a Day.      • Turmeric 500 MG tablet Take 1 tablet by mouth Daily.      • valsartan (DIOVAN) 320 MG tablet Take 320 mg by mouth Daily.      • vilazodone (VIIBRYD) 40 MG tablet tablet Take 40 mg by mouth Daily.         Vancomycin    Scheduled Meds:atorvastatin, 10 mg, Oral, Daily  buPROPion XL, 300 mg, Oral, Daily  cholecalciferol, 2,000 Units, Oral, Daily  gabapentin, 600 mg, Oral, Daily  gabapentin, 900 mg, Oral, BID  hydroCHLOROthiazide, 25 mg, Oral, Daily  insulin lispro, 0-7 Units, Subcutaneous, TID AC  pantoprazole, 40 mg, Oral,  "QAM AC  sodium chloride, 3 mL, Intravenous, Q12H  topiramate, 100 mg, Oral, BID  valsartan, 320 mg, Oral, Daily  vilazodone, 40 mg, Oral, Daily      Continuous Infusions:dextrose 5 % and sodium chloride 0.45 %, 50 mL/hr, Last Rate: 50 mL/hr (12/03/20 0028)      PRN Meds:.bisacodyl  •  bisacodyl  •  dextrose  •  dextrose  •  glucagon (human recombinant)  •  insulin lispro **AND** insulin lispro  •  Morphine  •  ondansetron  •  polyethyl glycol-propyl glycol  •  potassium chloride **OR** potassium chloride **OR** potassium chloride  •  [COMPLETED] Insert peripheral IV **AND** sodium chloride  •  sodium chloride    Objective     Vital Signs   Vitals:    12/02/20 1822 12/02/20 1824 12/02/20 2213 12/03/20 0352   BP:  157/89 133/88 102/68   BP Location:  Left arm Left arm Right arm   Patient Position:  Sitting Lying Lying   Pulse:  87 84 78   Resp:  20 19 18   Temp:  98.5 °F (36.9 °C) 98.2 °F (36.8 °C) 97.2 °F (36.2 °C)   TempSrc:  Oral Oral Oral   SpO2:  98% 96% 96%   Weight: 108 kg (237 lb)  107 kg (234 lb 12.8 oz) 107 kg (234 lb 12.8 oz)   Height: 160 cm (63\")  160 cm (63\")          Physical Exam:   Alert and orient x3, pleasant, obese,  Left upper extremity is in a sling.  No bruising.  Tender at the proximal humerus.  Good sensation distally.  5/5 radial median and ulnar nerve function.  2+ radial pulse.    X-rays show a moderately displaced 2 part proximal humerus fracture    Results Review:   I reviewed the patient's new clinical results.  I reviewed the patient's new imaging results    Lab Results (last 24 hours)     Procedure Component Value Units Date/Time    POC Glucose Once [[id number]]  (Abnormal) Collected: 12/03/20 0556    Specimen: Blood Updated: 12/03/20 0558     Glucose 125 mg/dL      Comment: Serial Number: [id number]:  [id number]       Basic Metabolic Panel [[id number]]  (Abnormal) Collected: 12/03/20 0416    Specimen: Blood Updated: 12/03/20 0532     Glucose 121 mg/dL      BUN 17 mg/dL      " Creatinine 0.75 mg/dL      Sodium 140 mmol/L      Potassium 3.3 mmol/L      Chloride 106 mmol/L      CO2 25.0 mmol/L      Calcium 9.0 mg/dL      eGFR Non African Amer 79 mL/min/1.73      BUN/Creatinine Ratio 22.7     Anion Gap 9.0 mmol/L     Narrative:      GFR Normal >60  Chronic Kidney Disease <60  Kidney Failure <15      CBC & Differential [[id number]]  (Abnormal) Collected: 12/03/20 0416    Specimen: Blood Updated: 12/03/20 0452    Narrative:      The following orders were created for panel order CBC & Differential.  Procedure                               Abnormality         Status                     ---------                               -----------         ------                     CBC Auto Differential[[id number]]        Abnormal            Final result                 Please view results for these tests on the individual orders.    CBC Auto Differential [[id number]]  (Abnormal) Collected: 12/03/20 0416    Specimen: Blood Updated: 12/03/20 0452     WBC 6.80 10*3/mm3      RBC 4.20 10*6/mm3      Hemoglobin 13.5 g/dL      Hematocrit 40.0 %      MCV 95.3 fL      MCH 32.2 pg      MCHC 33.8 g/dL      RDW 13.9 %      RDW-SD 45.9 fl      MPV 7.4 fL      Platelets 129 10*3/mm3      Neutrophil % 64.9 %      Lymphocyte % 21.9 %      Monocyte % 10.3 %      Eosinophil % 2.6 %      Basophil % 0.3 %      Neutrophils, Absolute 4.40 10*3/mm3      Lymphocytes, Absolute 1.50 10*3/mm3      Monocytes, Absolute 0.70 10*3/mm3      Eosinophils, Absolute 0.20 10*3/mm3      Basophils, Absolute 0.00 10*3/mm3      nRBC 0.1 /100 WBC     Hemoglobin A1c [[id number]] Collected: 12/03/20 0416    Specimen: Blood Updated: 12/03/20 0449    POC Glucose Once [[id number]]  (Abnormal) Collected: 12/02/20 2217    Specimen: Blood Updated: 12/02/20 2218     Glucose 119 mg/dL      Comment: Serial Number: [id number]:  [id number]       COVID PRE-OP / PRE-PROCEDURE SCREENING ORDER (NO ISOLATION) - Swab, Nasopharynx [[id number]]  (Normal) Collected:  12/02/20 2015    Specimen: Swab from Nasopharynx Updated: 12/02/20 2102    Narrative:      The following orders were created for panel order COVID PRE-OP / PRE-PROCEDURE SCREENING ORDER (NO ISOLATION) - Swab, Nasopharynx.  Procedure                               Abnormality         Status                     ---------                               -----------         ------                     COVID-19,CEPHEID,COR/TEO...[[id number]]  Normal              Final result                 Please view results for these tests on the individual orders.    COVID-19,CEPHEID,COR/TEO/PAD IN-HOUSE(OR EMERGENT/ADD-ON),NP SWAB IN TRANSPORT MEDIA 3-4 HR TAT - Swab, Nasopharynx [[id number]]  (Normal) Collected: 12/02/20 2015    Specimen: Swab from Nasopharynx Updated: 12/02/20 2102     COVID19 Not Detected    Narrative:      Fact sheet for providers: https://www.fda.gov/media/975752/download     Fact sheet for patients: https://www.fda.gov/media/061507/download    Basic Metabolic Panel [[id number]]  (Abnormal) Collected: 12/02/20 1939    Specimen: Blood Updated: 12/02/20 2030     Glucose 117 mg/dL      BUN 16 mg/dL      Creatinine 0.68 mg/dL      Sodium 140 mmol/L      Potassium 3.3 mmol/L      Chloride 104 mmol/L      CO2 24.0 mmol/L      Calcium 9.8 mg/dL      eGFR Non African Amer 89 mL/min/1.73      BUN/Creatinine Ratio 23.5     Anion Gap 12.0 mmol/L     Narrative:      GFR Normal >60  Chronic Kidney Disease <60  Kidney Failure <15      aPTT [[id number]]  (Abnormal) Collected: 12/02/20 1939    Specimen: Blood Updated: 12/02/20 2000     PTT 23.6 seconds     Protime-INR [[id number]]  (Normal) Collected: 12/02/20 1939    Specimen: Blood Updated: 12/02/20 2000     Protime 11.0 Seconds      INR 1.00    CBC & Differential [[id number]]  (Abnormal) Collected: 12/02/20 1939    Specimen: Blood Updated: 12/02/20 1947    Narrative:      The following orders were created for panel order CBC & Differential.  Procedure                                Abnormality         Status                     ---------                               -----------         ------                     CBC Auto Differential[[id number]]        Abnormal            Final result                 Please view results for these tests on the individual orders.    CBC Auto Differential [[id number]]  (Abnormal) Collected: 12/02/20 1939    Specimen: Blood Updated: 12/02/20 1947     WBC 9.80 10*3/mm3      RBC 4.50 10*6/mm3      Hemoglobin 14.5 g/dL      Hematocrit 42.1 %      MCV 93.7 fL      MCH 32.1 pg      MCHC 34.3 g/dL      RDW 13.4 %      RDW-SD 43.8 fl      MPV 7.5 fL      Platelets 150 10*3/mm3      Neutrophil % 78.4 %      Lymphocyte % 13.1 %      Monocyte % 6.6 %      Eosinophil % 1.6 %      Basophil % 0.3 %      Neutrophils, Absolute 7.70 10*3/mm3      Lymphocytes, Absolute 1.30 10*3/mm3      Monocytes, Absolute 0.60 10*3/mm3      Eosinophils, Absolute 0.20 10*3/mm3      Basophils, Absolute 0.00 10*3/mm3      nRBC 0.1 /100 WBC           Imaging Results (Last 24 Hours)     Procedure Component Value Units Date/Time    XR Chest 1 View [[id number]] Collected: 12/02/20 2005     Updated: 12/02/20 2008    Narrative:      DATE OF EXAM:  12/2/2020 7:13 PM     PROCEDURE:  XR CHEST 1 VW-     INDICATIONS:  pre-op; W19.XXXA-Unspecified fall, initial encounter; S42.292A-Other  displaced fracture of upper end of left humerus, initial encounter for  closed fracture; M25.412-Effusion, left shoulder     COMPARISON:  No Comparisons Available     TECHNIQUE:   Single radiographic view of the chest was obtained.     FINDINGS:  There appear to be mild opacities in the left lung base and perihilar  left upper lobe. No definite right lung infiltrate is seen. No effusion  or pneumothorax is identified.  Heart size appears grossly normal.   There is atherosclerosis of the aorta. No definite acute osseous  abnormality is seen.       Impression:      1.Mild opacities in the left lung base and perihilar left upper  lobe.  Findings are nonspecific but could indicate multifocal pneumonia.  2.Radiographic follow-up is recommended to ensure resolution.     Electronically Signed By-Kee Armstrong MD On:12/2/2020 8:06 PM  This report was finalized on [id number] by  Kee Armstrong MD.            Assessment/Plan     Left 2 part proximal humerus fracture    Discussed operative versus nonoperative treatment.  Due to the patient's younger age feel she would do best with open reduction internal fixation to avoid malunion.  She understands risk.  Patient understands the risks.  These include bleeding, infection, damage to nerves or vessels, malunion, nonunion, possible need for further surgery, pain, and risk of medical or anesthetic complications including risk of death.  Proceed with surgery today.        Yvan Echols MD  12/03/20  07:20 EST

## 2020-12-03 NOTE — OP NOTE
HUMERUS PROXIMAL OPEN REDUCTION INTERNAL FIXATION  Procedure Report    Patient Name:  Eve Skelton  YOB: 1960    Date of Surgery:  12/3/2020         Pre-op Diagnosis: Left 2 part proximal humerus fracture    Postop diagnosis: Same    Indication: 60-year-old white female with left proximal humerus fracture angulated into valgus.  Indicated reduce and stabilize this to prevent a malunion    Procedure/CPT® Codes:      Procedure(s):  HUMERUS PROXIMAL OPEN REDUCTION INTERNAL FIXATION, left    Staff:  Surgeon(s):  Yvan Echols MD    Assistant: Yovany Kemp    Anesthesia: General with Block    Estimated Blood Loss: 200 mL    Implants:    Implant Name Type Inv. Item Serial No.  Lot No. LRB No. Used Action   SUT FW #2 W/TPR NDL 1/2 CIR 38IN 97CM 26.5MM VIRGIE - [id number] Implant SUT FW #2 W/TPR NDL 1/2 CIR 38IN 97CM 26.5MM VIRGIE  [employer] [id number] Left 1 Implanted   SUT FW 2 REV CUT 38IN [id number] - [id number] Implant SUT FW 2 REV CUT 38IN [id number]  [employer] [id number] Left 2 Implanted   PLT HUM AXSOS LAT/PROX 3H 86MM LT - [id number] Implant PLT HUM AXSOS LAT/PROX 3H 86MM LT  [employer] SAM . Left 1 Implanted   SCRW VALENTÍN AXSOS TI 3.5X26MM - [id number] Implant SCRW VALENTÍN AXSOS TI 3.5X26MM  [employer] SAM . Left 1 Implanted   SCRW VALENTÍN AXSOS TI 3.5X24MM - [id number] Implant SCRW VALENTÍN AXSOS TI 3.5X24MM  [employer] SAM . Left 1 Implanted   SCRW LK AXSOS 4X42MM - [id number] Implant SCRW LK AXSOS 4X42MM  [employer] SAM . Left 1 Implanted   SCRW LK AXSOS 4X34MM - [id number] Implant SCRW LK AXSOS 4X34MM  [employer] SAM . Left 1 Implanted   SCRW LK AXSOS 4X36MM - [id number] Implant SCRW LK AXSOS 4X36MM  [employer]SON SAM . Left 1 Implanted   SCRW LK AXSOS 4X40MM - [id number] Implant SCRW LK AXSOS 4X40MM  WILSON SAM . Left 1 Implanted   SCRW LK AXSOS 4X18MM - [id number] Implant SCRW LK AXSOS 4X18MM  [employer] SAM . Left 1 Implanted       Specimen:          None        Findings: Valgus angulated proximal humerus  fracture    Complications: 0    Description of Procedure: Patient was seen in the holding room and consented to open reduction internal fixation of the affected shoulder.  Patient had preoperative 2 g Kefzol.  Patient was taken the OR with general anesthesia.  Set up in a semi-recline position on a reversed bed.  Preoperative views were taken with C-arm showing adequate visualization.  A small roll was placed in the scapula.  Face was protected.  The patient had sterile prep and draping of the affected extremity.  The deltopectoral incision was marked.  This was pre-injected with 10 cc of half percent Marcaine with epinephrine.  Incision was carried down to fascia.  The cephalic vein was identified and retracted laterally while bluntly exposing the deltopectoral incision.  The fracture was identified and cleaned out and then reduced using clamps and #2 FiberWire sutures to suture to the greater tuberosity to suture to the plate the Chapin Variaxx titanium proximal humerus plate was then applied.  Anchored using a guidewire through the superior guide hole.  This is verified to be in good position.  The sutures were placed through the suture holes on the plate.  The plate was then anchored to the shaft with two 3.5 mm cortical screws.  4 mm fully threaded locking screws have been placed to divergent angles using the guides to the proximal plate staying about 8 mm short of subcortical bone.  A total of 4 locking screws were placed proximally.  A final unicortical locking screw was placed at the distal end of the plate.  Final C-arm views were taken showing good reduction.  The screws were then locked with the torque wrench locking the locking screws only.  The #2 FiberWire suture were tied to the plate.  The wound was then irrigated.  Final C-arm views were taken showing good reduction and screw length.  The subcu tissues closed with 2-0 Vicryl and the skin was closed with staples.  Sterile dressings and a sling were  applied.  Patient was left stable in the OR.  Plan is for pendulum exercises only.  Yvan Echols MD     Date: 12/3/2020  Time: 17:05 EST

## 2020-12-03 NOTE — ED PROVIDER NOTES
Subjective   Patient is a 60-year-old obese white female with history of hypertension, diabetes, anxiety, restless leg syndrome, breast cancer who presents today with complaints of left upper arm pain status post fall.  Patient states she slipped on ice this morning and fell.  She denies striking her head or having LOC.  She states she called her primary care provider today after she fell and had an outpatient x-ray obtained of her upper arm.  She states she was later called and told that her arm was fractured and that she needed to come to the ED for further evaluation and treatment.  Patient states she has been using ice her sling and her prescribed hydrocodone since the fall.  She denies any numbness tingling or weakness distal to the injury.  She denies any other injury or complaint.          Review of Systems   Constitutional: Negative for fever.   Cardiovascular: Negative for chest pain.   Gastrointestinal: Negative for abdominal pain and nausea.   Musculoskeletal: Negative for back pain and neck pain.        Left upper arm pain   Neurological: Negative for weakness, numbness and headaches.       Past Medical History:   Diagnosis Date   • Anxiety    • Breast cancer (CMS/HCC)    • Diabetes (CMS/HCC)    • Headache    • HTN (hypertension)    • RLS (restless legs syndrome)        Allergies   Allergen Reactions   • Vancomycin Other (See Comments)     Ringing of ears        Past Surgical History:   Procedure Laterality Date   • BREAST BIOPSY     • BREAST CYST ASPIRATION     • BREAST CYST EXCISION     • BREAST LUMPECTOMY         Family History   Problem Relation Age of Onset   • No Known Problems Mother    • Breast cancer Father    • Breast cancer Sister    • Breast cancer Brother    • No Known Problems Daughter    • No Known Problems Son    • No Known Problems Maternal Grandmother    • No Known Problems Paternal Grandmother    • No Known Problems Maternal Aunt    • No Known Problems Paternal Aunt        Social  History     Socioeconomic History   • Marital status:      Spouse name: Not on file   • Number of children: Not on file   • Years of education: Not on file   • Highest education level: Not on file   Tobacco Use   • Smoking status: Former Smoker     Quit date:      Years since quittin.9   • Smokeless tobacco: Never Used   Substance and Sexual Activity   • Alcohol use: Yes   • Drug use: Never   • Sexual activity: Defer           Objective   Physical Exam  Vital signs and triage nurse note reviewed.  Constitutional: Awake, alert; well-developed and well-nourished. No acute distress is noted.  HEENT: Normocephalic, atraumatic; pupils are PERRL with intact EOM; oropharynx is pink and moist without exudate or erythema.  No drooling or pooling of oral secretions.  Neck: Supple, full range of motion without pain; no cervical lymphadenopathy. Normal phonation.  Cardiovascular: Regular rate and rhythm, normal S1-S2.  No murmur noted.  Pulmonary: Respiratory effort regular nonlabored, breath sounds clear to auscultation all fields.  Abdomen: Soft, nontender, nondistended with normoactive bowel sounds; no rebound or guarding.  Musculoskeletal: Decreased range of motion left shoulder due to pain.  There is tenderness over the left shoulder and upper arm diffusely.  There is no erythema or ecchymosis.  No open wounds.  There is no gross deformity.  Distal neurovascular motor intact.  Neuro: Alert oriented x3, speech is clear and appropriate, GCS 15.    Skin: Flesh tone, warm, dry, intact; no erythematous or petechial rash or lesion.      Procedures           ED Course      Labs Reviewed   COVID PRE-OP / PRE-PROCEDURE SCREENING ORDER (NO ISOLATION)    Narrative:     The following orders were created for panel order COVID PRE-OP / PRE-PROCEDURE SCREENING ORDER (NO ISOLATION) - Swab, Nasopharynx.  Procedure                               Abnormality         Status                     ---------                                -----------         ------                     COVID-19,CEPHEID,COR/TEO...[[id number]]                                                   Please view results for these tests on the individual orders.   COVID-19,CEPHEID,COR/TEO/PAD IN-HOUSE(OR EMERGENT/ADD-ON),NP SWAB IN TRANSPORT MEDIA 3-4 HR TAT   BASIC METABOLIC PANEL   PROTIME-INR   APTT   CBC WITH AUTO DIFFERENTIAL   TYPE AND SCREEN   CBC AND DIFFERENTIAL    Narrative:     The following orders were created for panel order CBC & Differential.  Procedure                               Abnormality         Status                     ---------                               -----------         ------                     CBC Auto Differential[[id number]]                                                         Please view results for these tests on the individual orders.     No radiology results for the last day  Medications   sodium chloride 0.9 % flush 10 mL (has no administration in time range)   Morphine sulfate (PF) injection 4 mg (4 mg Intravenous Given 12/2/20 1939)   ondansetron (ZOFRAN) injection 4 mg (4 mg Intravenous Given 12/2/20 1939)                                          MDM  Number of Diagnoses or Management Options  Closed fracture of head of left humerus, initial encounter:   Effusion of left shoulder joint:   Fall, initial encounter:   Diagnosis management comments: Patient's x-ray report from Friday radiology was reviewed and shows an acute comminuted displaced left humeral head and neck fracture with joint effusion.    The patient was discussed with Dr. Echols, on-call orthopedic surgeon who agrees to consult and plans for surgery late tomorrow.  He would like the patient admitted to the hospitalist service.    Patient had IV established.  She had preop labs, EKG and chest x-ray obtained.  She was given morphine for pain.    She was discussed with the hospitalist nurse practitioner.  She will be admitted for pain control, orthopedic intervention  tomorrow.    Diagnosis and treatment plan discussed with patient.  Patient agreeable to plan.          Amount and/or Complexity of Data Reviewed  Clinical lab tests: ordered and reviewed    Patient Progress  Patient progress: stable      Final diagnoses:   Fall, initial encounter   Closed fracture of head of left humerus, initial encounter   Effusion of left shoulder joint            Macrina Malone, BRENNEN  12/02/20 1944

## 2020-12-03 NOTE — PROGRESS NOTES
Discharge Planning Assessment  [facility]     Patient Name: Eve Skelton  MRN: [id number]  Today's Date: 12/3/2020    Admit Date: 12/2/2020    Discharge Needs Assessment     Row Name 12/03/20 1313       Living Environment    Lives With  spouse    Current Living Arrangements  home/apartment/condo    Primary Care Provided by  self    Provides Primary Care For  no one, unable/limited ability to care for self    Family Caregiver if Needed  spouse       Resource/Environmental Concerns    Transportation Concerns  car, none       Transition Planning    Transportation Anticipated  family or friend will provide       Discharge Needs Assessment    Readmission Within the Last 30 Days  no previous admission in last 30 days    Equipment Currently Used at Home  none    Concerns to be Addressed  no discharge needs identified    Provided Post Acute Provider List?  N/A    N/A Provider List Comment  no needs identified at this time        Discharge Plan     Row Name 12/03/20 1314       Plan    Plan  anticipate return home    Patient/Family in Agreement with Plan  yes    Plan Comments  spoke to patient in room with ppe(mask and goggles); standing less than 6 feet away and less than 15 mins in room; she states she is independent at home with spouse and follows with dr gross; to have or for fracture       Carol naegele rn  Case management  Office number [phone]  Cell phone [phone]

## 2020-12-03 NOTE — ANESTHESIA PREPROCEDURE EVALUATION
Anesthesia Evaluation     Patient summary reviewed and Nursing notes reviewed   NPO Solid Status: > 8 hours  NPO Liquid Status: > 8 hours           Airway   Mallampati: II  TM distance: >3 FB  Neck ROM: full  No difficulty expected  Dental - normal exam     Pulmonary - normal exam    breath sounds clear to auscultation  (+) sleep apnea on CPAP,   Cardiovascular - normal exam  Exercise tolerance: unable to assess    ECG reviewed  Rhythm: regular  Rate: normal    (+) hypertension, hyperlipidemia,       Neuro/Psych  (+) headaches, psychiatric history Anxiety,     GI/Hepatic/Renal/Endo    (+) morbid obesity, GERD,  diabetes mellitus,     Musculoskeletal     Abdominal  - normal exam   Substance History - negative use     OB/GYN negative ob/gyn ROS         Other   arthritis,    history of cancer remission    ROS/Med Hx Other: Breast                Anesthesia Plan    ASA 3     general with block   (ISB)  intravenous induction     Anesthetic plan, all risks, benefits, and alternatives have been provided, discussed and informed consent has been obtained with: patient.

## 2020-12-03 NOTE — PROGRESS NOTES
"      [facility] Medicine Services Daily Progress Note      Hospitalist Team  LOS 0 days      Patient Care Team:  Yvan Calderon MD as PCP - General    Patient Location: 4128/1      Subjective   Subjective     Chief Complaint / Subjective  Chief Complaint   Patient presents with   • Fall     fall, left arm pain, pt [facility] today.sent in by CONSTANTIN Calderon.          Brief Synopsis of Hospital Course/HPI  Patient is a 60 year old female BMI 41 with multiple medical issues presents after seeing her PCP after a fall on ice today. She had an XR of her left arm at [facility] which showed left humeral head and neck fracture. She reports simply a mechanical fall on ice. She denies hitting her head. Review of records shows multiple fractures ; left wrist and left olecranon post falls in the past. Labs are basically unremarkable. CXR per radiology today was done for pre-op :  \"1.Mild opacities in the left lung base and perihilar left upper lobe.  Findings are nonspecific but could indicate multifocal pneumonia.  2.Radiographic follow-up is recommended to ensure resolution.\"     Covid-19 was negative, she is afebrile and denies any fever, cough or congestion. PMH of diabetes mellitus Type 2, anxiety , Breast cancer, RLS and hypertension. Dr Echols of orthopedics has been consulted and she will be admitted for further evaluation and treatment with planned ORIF  Tomorrow per ortho.     12/3/2020- Patient lying in bed complaining of some left arm pain. Plan on ORIF this afternoon         Review of Systems   Constitution: Negative for chills and fever.   Cardiovascular: Negative for chest pain and palpitations.   Respiratory: Negative for cough and shortness of breath.          Objective   Objective      Vital Signs  Temp:  [96.7 °F (35.9 °C)-98.7 °F (37.1 °C)] 98.1 °F (36.7 °C)  Heart Rate:  [62-84] 69  Resp:  [10-19] 14  BP: (102-164)/(57-94) 126/77  Oxygen Therapy  SpO2: 97 %  Pulse " "Oximetry Type: Intermittent  Device (Oxygen Therapy): nasal cannula  Flow (L/min): 2  Flowsheet Rows      First Filed Value   Admission Height  160 cm (63\") Documented at 12/02/2020 1822   Admission Weight  108 kg (237 lb) Documented at 12/02/2020 1822        Intake & Output (last 3 days)       11/30 0701 - 12/01 0700 12/01 0701 - 12/02 0700 12/02 0701 - 12/03 0700 12/03 0701 - 12/04 0700    P.O.    0    I.V. (mL/kg)   1000 (9.3) 800 (7.5)    Total Intake(mL/kg)   1000 (9.3) 800 (7.5)    Urine (mL/kg/hr)   400 650 (0.5)    Other    200    Total Output   400 850    Net   +600 -50            Urine Unmeasured Occurrence   1 x         Lines, Drains & Airways    Active LDAs     Name:   Placement date:   Placement time:   Site:   Days:    Peripheral IV 12/02/20 1938 Right;Upper Forearm   12/02/20 1938    Forearm   less than 1                  Physical Exam:    Physical Exam  Vitals signs reviewed.   Constitutional:       Appearance: She is morbidly obese.   HENT:      Head: Normocephalic and atraumatic.      Mouth/Throat:      Mouth: Mucous membranes are moist.   Eyes:      General: No scleral icterus.     Pupils: Pupils are equal, round, and reactive to light.   Cardiovascular:      Rate and Rhythm: Normal rate and regular rhythm.      Heart sounds: No murmur.   Pulmonary:      Effort: Pulmonary effort is normal. No respiratory distress.      Breath sounds: No wheezing or rales.   Musculoskeletal:      Comments: Left arm in sling    Skin:     General: Skin is warm and dry.      Findings: No rash.   Neurological:      Mental Status: She is alert.              Wounds (last 24 hours)      LDA Wound     Row Name 12/03/20 1731 12/03/20 1718 12/03/20 1703       Wound 12/03/20 1623 Left shoulder Incision    Wound - Properties Group Placement Date: 12/03/20  -KIT Placement Time: 1623  -KIT Present on Hospital Admission: N  -KIT Side: Left  -KIT Location: shoulder  -KIT Primary Wound Type: Incision  -KIT    Closure  COLLEEN  -DM  COLLEEN  " -DM  COLLEEN  -DM    Retired Wound - Properties Group Date first assessed: 12/03/20  -KIT Time first assessed: 1623  -KIT Present on Hospital Admission: N  -KIT Side: Left  -KIT Location: shoulder  -KIT Primary Wound Type: Incision  -KIT    Row Name 12/03/20 1648 12/03/20 1633 12/03/20 1623       Wound 12/03/20 1623 Left shoulder Incision    Wound - Properties Group Placement Date: 12/03/20  -KIT Placement Time: 1623  -KIT Present on Hospital Admission: N  -KIT Side: Left  -KIT Location: shoulder  -KIT Primary Wound Type: Incision  -KIT    Dressing Appearance  --  dry;intact;no drainage  -DM  --    Closure  COLLEEN  -DM  COLLEEN  -DM  --    Dressing Care  --  --  -- staples, xeroform, 4x4s, foam tape  -KIT    Retired Wound - Properties Group Date first assessed: 12/03/20  -KIT Time first assessed: 1623  -KIT Present on Hospital Admission: N  -KIT Side: Left  -KTI Location: shoulder  -KIT Primary Wound Type: Incision  -KIT      User Key  (r) = Recorded By, (t) = Taken By, (c) = Cosigned By    Initials Name Provider Type    Lina Horn, RN Registered Nurse    Che Hebert RN Registered Nurse          Procedures:    Procedure(s):  HUMERUS PROXIMAL OPEN REDUCTION INTERNAL FIXATION          Results Review:     I reviewed the patient's new clinical results.      Lab Results (last 24 hours)     Procedure Component Value Units Date/Time    POC Glucose Once [[id number]]  (Abnormal) Collected: 12/03/20 1641    Specimen: Blood Updated: 12/03/20 1645     Glucose 120 mg/dL      Comment: Serial Number: [id number]:  [id number]       Hemoglobin A1c [[id number]]  (Normal) Collected: 12/03/20 0416    Specimen: Blood Updated: 12/03/20 1436     Hemoglobin A1C 5.6 %     Narrative:      Hemoglobin A1C Reference Range:    <5.7 %        Normal  5.7-6.4 %     Increased risk for diabetes  > 6.4 %        Diabetes       These guidelines have been recommended by the American Diabetic Association for Hgb A1c.      The following 2010 guidelines have been  recommended by the American Diabetes Association for Hemoglobin A1c.    HBA1c 5.7-6.4% Increased risk for future diabetes (pre-diabetes)  HBA1c     >6.4% Diabetes      POC Glucose Once [[id number]]  (Abnormal) Collected: 12/03/20 1159    Specimen: Blood Updated: 12/03/20 1214     Glucose 124 mg/dL      Comment: Serial Number: [id number]:  [id number]       POC Glucose Once [[id number]]  (Abnormal) Collected: 12/03/20 0556    Specimen: Blood Updated: 12/03/20 0558     Glucose 125 mg/dL      Comment: Serial Number: [id number]:  [id number]       Basic Metabolic Panel [[id number]]  (Abnormal) Collected: 12/03/20 0416    Specimen: Blood Updated: 12/03/20 0532     Glucose 121 mg/dL      BUN 17 mg/dL      Creatinine 0.75 mg/dL      Sodium 140 mmol/L      Potassium 3.3 mmol/L      Chloride 106 mmol/L      CO2 25.0 mmol/L      Calcium 9.0 mg/dL      eGFR Non African Amer 79 mL/min/1.73      BUN/Creatinine Ratio 22.7     Anion Gap 9.0 mmol/L     Narrative:      GFR Normal >60  Chronic Kidney Disease <60  Kidney Failure <15      CBC & Differential [[id number]]  (Abnormal) Collected: 12/03/20 0416    Specimen: Blood Updated: 12/03/20 0452    Narrative:      The following orders were created for panel order CBC & Differential.  Procedure                               Abnormality         Status                     ---------                               -----------         ------                     CBC Auto Differential[[id number]]        Abnormal            Final result                 Please view results for these tests on the individual orders.    CBC Auto Differential [[id number]]  (Abnormal) Collected: 12/03/20 0416    Specimen: Blood Updated: 12/03/20 0452     WBC 6.80 10*3/mm3      RBC 4.20 10*6/mm3      Hemoglobin 13.5 g/dL      Hematocrit 40.0 %      MCV 95.3 fL      MCH 32.2 pg      MCHC 33.8 g/dL      RDW 13.9 %      RDW-SD 45.9 fl      MPV 7.4 fL      Platelets 129 10*3/mm3      Neutrophil % 64.9 %       Lymphocyte % 21.9 %      Monocyte % 10.3 %      Eosinophil % 2.6 %      Basophil % 0.3 %      Neutrophils, Absolute 4.40 10*3/mm3      Lymphocytes, Absolute 1.50 10*3/mm3      Monocytes, Absolute 0.70 10*3/mm3      Eosinophils, Absolute 0.20 10*3/mm3      Basophils, Absolute 0.00 10*3/mm3      nRBC 0.1 /100 WBC     POC Glucose Once [[id number]]  (Abnormal) Collected: 12/02/20 2217    Specimen: Blood Updated: 12/02/20 2218     Glucose 119 mg/dL      Comment: Serial Number: [id number]:  [id number]       COVID PRE-OP / PRE-PROCEDURE SCREENING ORDER (NO ISOLATION) - Swab, Nasopharynx [[id number]]  (Normal) Collected: 12/02/20 2015    Specimen: Swab from Nasopharynx Updated: 12/02/20 2102    Narrative:      The following orders were created for panel order COVID PRE-OP / PRE-PROCEDURE SCREENING ORDER (NO ISOLATION) - Swab, Nasopharynx.  Procedure                               Abnormality         Status                     ---------                               -----------         ------                     COVID-19,CEPHEID,COR/TEO...[[id number]]  Normal              Final result                 Please view results for these tests on the individual orders.    COVID-19,CEPHEID,COR/TEO/PAD IN-HOUSE(OR EMERGENT/ADD-ON),NP SWAB IN TRANSPORT MEDIA 3-4 HR TAT - Swab, Nasopharynx [[id number]]  (Normal) Collected: 12/02/20 2015    Specimen: Swab from Nasopharynx Updated: 12/02/20 2102     COVID19 Not Detected    Narrative:      Fact sheet for providers: https://www.fda.gov/media/710550/download     Fact sheet for patients: https://www.fda.gov/media/530629/download    Basic Metabolic Panel [[id number]]  (Abnormal) Collected: 12/02/20 1939    Specimen: Blood Updated: 12/02/20 2030     Glucose 117 mg/dL      BUN 16 mg/dL      Creatinine 0.68 mg/dL      Sodium 140 mmol/L      Potassium 3.3 mmol/L      Chloride 104 mmol/L      CO2 24.0 mmol/L      Calcium 9.8 mg/dL      eGFR Non African Amer 89 mL/min/1.73      BUN/Creatinine Ratio  23.5     Anion Gap 12.0 mmol/L     Narrative:      GFR Normal >60  Chronic Kidney Disease <60  Kidney Failure <15      aPTT [[id number]]  (Abnormal) Collected: 12/02/20 1939    Specimen: Blood Updated: 12/02/20 2000     PTT 23.6 seconds     Protime-INR [[id number]]  (Normal) Collected: 12/02/20 1939    Specimen: Blood Updated: 12/02/20 2000     Protime 11.0 Seconds      INR 1.00    CBC & Differential [[id number]]  (Abnormal) Collected: 12/02/20 1939    Specimen: Blood Updated: 12/02/20 1947    Narrative:      The following orders were created for panel order CBC & Differential.  Procedure                               Abnormality         Status                     ---------                               -----------         ------                     CBC Auto Differential[[id number]]        Abnormal            Final result                 Please view results for these tests on the individual orders.    CBC Auto Differential [[id number]]  (Abnormal) Collected: 12/02/20 1939    Specimen: Blood Updated: 12/02/20 1947     WBC 9.80 10*3/mm3      RBC 4.50 10*6/mm3      Hemoglobin 14.5 g/dL      Hematocrit 42.1 %      MCV 93.7 fL      MCH 32.1 pg      MCHC 34.3 g/dL      RDW 13.4 %      RDW-SD 43.8 fl      MPV 7.5 fL      Platelets 150 10*3/mm3      Neutrophil % 78.4 %      Lymphocyte % 13.1 %      Monocyte % 6.6 %      Eosinophil % 1.6 %      Basophil % 0.3 %      Neutrophils, Absolute 7.70 10*3/mm3      Lymphocytes, Absolute 1.30 10*3/mm3      Monocytes, Absolute 0.60 10*3/mm3      Eosinophils, Absolute 0.20 10*3/mm3      Basophils, Absolute 0.00 10*3/mm3      nRBC 0.1 /100 WBC         Hemoglobin A1C   Date Value Ref Range Status   12/03/2020 5.6 3.5 - 5.6 % Final     Results from last 7 days   Lab Units 12/02/20 1939   INR  1.00           No results found for: LIPASE  No results found for: CHOL, CHLPL, TRIG, HDL, LDL, LDLDIRECT    No results found for: INTRAOP, PREDX, FINALDX, COMDX    Microbiology Results (last 10 days)      Procedure Component Value - Date/Time    COVID PRE-OP / PRE-PROCEDURE SCREENING ORDER (NO ISOLATION) - Swab, Nasopharynx [[id number]]  (Normal) Collected: 12/02/20 2015    Lab Status: Final result Specimen: Swab from Nasopharynx Updated: 12/02/20 2102    Narrative:      The following orders were created for panel order COVID PRE-OP / PRE-PROCEDURE SCREENING ORDER (NO ISOLATION) - Swab, Nasopharynx.  Procedure                               Abnormality         Status                     ---------                               -----------         ------                     COVID-19,CEPHEID,COR/TEO...[[id number]]  Normal              Final result                 Please view results for these tests on the individual orders.    COVID-19,CEPHEID,COR/TEO/PAD IN-HOUSE(OR EMERGENT/ADD-ON),NP SWAB IN TRANSPORT MEDIA 3-4 HR TAT - Swab, Nasopharynx [[id number]]  (Normal) Collected: 12/02/20 2015    Lab Status: Final result Specimen: Swab from Nasopharynx Updated: 12/02/20 2102     COVID19 Not Detected    Narrative:      Fact sheet for providers: https://www.fda.gov/media/547924/download     Fact sheet for patients: https://www.fda.gov/media/992271/download          ECG/EMG Results (most recent)     Procedure Component Value Units Date/Time    ECG 12 Lead [[id number]] Collected: 12/02/20 1911     Updated: 12/02/20 1913     QT Interval 367 ms     Narrative:      HEART RATE= 82  bpm  RR Interval= 732  ms  CO Interval= 166  ms  P Horizontal Axis= -21  deg  P Front Axis= 19  deg  QRSD Interval= 91  ms  QT Interval= 367  ms  QRS Axis= -25  deg  T Wave Axis= 9  deg  - ABNORMAL ECG -  Sinus rhythm  Anterior Q waves, possibly due to LVH  No previous ECG available for comparison  Electronically Signed By:   Date and Time of Study: 2020-12-02 19:11:19                    Xr Chest 1 View    Result Date: 12/2/2020  1.Mild opacities in the left lung base and perihilar left upper lobe. Findings are nonspecific but could indicate multifocal  pneumonia. 2.Radiographic follow-up is recommended to ensure resolution.  Electronically Signed By-Kee Armstrong MD On:12/2/2020 8:06 PM This report was finalized on [id number] by  Kee Armstrong MD.          Xrays, labs reviewed personally by physician.    Medication Review:   I have reviewed the patient's current medication list      Scheduled Meds  atorvastatin, 10 mg, Oral, Daily  buPROPion XL, 300 mg, Oral, Daily  ceFAZolin, 2 g, Intravenous, Q8H  cholecalciferol, 2,000 Units, Oral, Daily  gabapentin, 600 mg, Oral, Daily  gabapentin, 900 mg, Oral, BID  insulin lispro, 0-7 Units, Subcutaneous, TID AC  meloxicam, 15 mg, Oral, Daily  pantoprazole, 40 mg, Oral, QAM AC  topiramate, 100 mg, Oral, BID  valsartan, 320 mg, Oral, Daily  vilazodone, 40 mg, Oral, Daily        Meds Infusions  sodium chloride, 125 mL/hr        Meds PRN  •  acetaminophen **OR** acetaminophen  •  bisacodyl  •  bisacodyl  •  dextrose  •  dextrose  •  HYDROcodone-acetaminophen  •  insulin lispro **AND** insulin lispro  •  Morphine **AND** naloxone  •  ondansetron **OR** ondansetron  •  oxyCODONE  •  polyethyl glycol-propyl glycol  •  potassium chloride **OR** potassium chloride **OR** potassium chloride  •  promethazine  •  [COMPLETED] Insert peripheral IV **AND** sodium chloride        Assessment/Plan   Assessment/Plan     Active Hospital Problems    Diagnosis  POA   • **Closed 2-part displaced fracture of surgical neck of left humerus [S42.222A]  Unknown   • Fall [W19.XXXA]  Yes   • Closed fracture of humerus [S42.309A]  Yes      Resolved Hospital Problems   No resolved problems to display.       MEDICAL DECISION MAKING COMPLEXITY BY PROBLEM:     Closed fracture of humeral head and neck per outside XR d/t mechanical fall on ice  - s/p ORIF 12/3/2020  - Dr. Echols managing   - hold aspirin and meloxicam for now  - PT/OT  - IV morphine prn     CXR shows possible multifocal pneumonia  - pt is afebrile, no leukocytosis or symptoms      Mild  hypokalemia  - replace    DM type 2  - hold metformin while inpatient   - continue ssi   - monitor with routine accuchecks achs      Anxiety   - on Wellbutrin and Viibryd  - lorazepam  20 per INSPECT unless not taking every day      HTN- controlled  - continue HCTZ and Valsartan      HLD  - on statin      GERD  - on PPI      Migraine prophylaxis   - on Topamax     RLS  - on high dose gabapentin ( INSPECT verified)     Morbid Obesity  -  on lifestyle changes as appropriate      DVT Prophylaxis  - SCDs    Pt is high risk receiving IV Morphine prn     VTE Prophylaxis -   Mechanical Order History:      Ordered        20 1838  Instruct patient to do at least 10 ankle pumps per hour while awake.  Continuous         20 1705  Place Venous Foot Pump  Once         20 1705  Maintain Venous Foot Pump  Once         20 2239  Place Sequential Compression Device  Once         20 2239  Maintain Sequential Compression Device  Continuous,   Status:  Canceled         20 2239  Place Sequential Compression Device  Once         20 2239  Maintain Sequential Compression Device  Continuous,   Status:  Canceled                 Pharmalogical Order History:     None            Code Status -   Code Status and Medical Interventions:   Ordered at: 204     Code Status:    CPR     Medical Interventions (Level of Support Prior to Arrest):    Full         Discharge Planning  Defer         Electronically signed by Naun Arroyo DO, 20, 18:48 EST.  [facility] Hospitalist Team

## 2020-12-04 VITALS
HEART RATE: 68 BPM | SYSTOLIC BLOOD PRESSURE: 120 MMHG | HEIGHT: 63 IN | WEIGHT: 234.8 LBS | OXYGEN SATURATION: 100 % | DIASTOLIC BLOOD PRESSURE: 78 MMHG | BODY MASS INDEX: 41.6 KG/M2 | RESPIRATION RATE: 16 BRPM | TEMPERATURE: 98.1 F

## 2020-12-04 LAB
ANION GAP SERPL CALCULATED.3IONS-SCNC: 9 MMOL/L (ref 5–15)
BUN SERPL-MCNC: 14 MG/DL (ref 6–20)
BUN/CREAT SERPL: 25.5 (ref 7–25)
CALCIUM SPEC-SCNC: 8.5 MG/DL (ref 8.6–10.5)
CHLORIDE SERPL-SCNC: 110 MMOL/L (ref 98–107)
CO2 SERPL-SCNC: 24 MMOL/L (ref 22–29)
CREAT SERPL-MCNC: 0.55 MG/DL (ref 0.57–1)
GFR SERPL CREATININE-BSD FRML MDRD: 113 ML/MIN/1.73
GLUCOSE BLDC GLUCOMTR-MCNC: 111 MG/DL (ref 70–105)
GLUCOSE SERPL-MCNC: 112 MG/DL (ref 65–99)
HCT VFR BLD AUTO: 36.1 % (ref 34–46.6)
HGB BLD-MCNC: 12.2 G/DL (ref 12–15.9)
POTASSIUM SERPL-SCNC: 3.9 MMOL/L (ref 3.5–5.2)
QT INTERVAL: 367 MS
SODIUM SERPL-SCNC: 143 MMOL/L (ref 136–145)

## 2020-12-04 PROCEDURE — 97110 THERAPEUTIC EXERCISES: CPT

## 2020-12-04 PROCEDURE — 25010000002 CEFAZOLIN PER 500 MG: Performed by: ORTHOPAEDIC SURGERY

## 2020-12-04 PROCEDURE — 97161 PT EVAL LOW COMPLEX 20 MIN: CPT

## 2020-12-04 PROCEDURE — 80048 BASIC METABOLIC PNL TOTAL CA: CPT | Performed by: ORTHOPAEDIC SURGERY

## 2020-12-04 PROCEDURE — 85014 HEMATOCRIT: CPT | Performed by: ORTHOPAEDIC SURGERY

## 2020-12-04 PROCEDURE — G0378 HOSPITAL OBSERVATION PER HR: HCPCS

## 2020-12-04 PROCEDURE — 82962 GLUCOSE BLOOD TEST: CPT

## 2020-12-04 PROCEDURE — 85018 HEMOGLOBIN: CPT | Performed by: ORTHOPAEDIC SURGERY

## 2020-12-04 PROCEDURE — 97165 OT EVAL LOW COMPLEX 30 MIN: CPT

## 2020-12-04 RX ORDER — OXYCODONE HYDROCHLORIDE AND ACETAMINOPHEN 5; 325 MG/1; MG/1
TABLET ORAL
Qty: 40 TABLET | Refills: 0 | Status: SHIPPED | OUTPATIENT
Start: 2020-12-04

## 2020-12-04 RX ADMIN — MELOXICAM 15 MG: 15 TABLET ORAL at 08:30

## 2020-12-04 RX ADMIN — BUPROPION HYDROCHLORIDE 300 MG: 150 TABLET, EXTENDED RELEASE ORAL at 08:31

## 2020-12-04 RX ADMIN — PANTOPRAZOLE SODIUM 40 MG: 40 TABLET, DELAYED RELEASE ORAL at 08:31

## 2020-12-04 RX ADMIN — VALSARTAN 320 MG: 80 TABLET, FILM COATED ORAL at 08:31

## 2020-12-04 RX ADMIN — TOPIRAMATE 100 MG: 100 TABLET, FILM COATED ORAL at 08:31

## 2020-12-04 RX ADMIN — CEFAZOLIN SODIUM 2 G: 10 INJECTION, POWDER, FOR SOLUTION INTRAVENOUS at 06:41

## 2020-12-04 RX ADMIN — Medication 2000 UNITS: at 08:31

## 2020-12-04 RX ADMIN — GABAPENTIN 900 MG: 300 CAPSULE ORAL at 08:31

## 2020-12-04 NOTE — PLAN OF CARE
Goal Outcome Evaluation:  Plan of Care Reviewed With: patient  Progress: no change   Pt. A&O X4, able to make needs known. LUE noted to have edema r/t recent surgery. Cont. To be numb & no tingling noted. Asst. X 1 to restroom, gait slow & steady. Ind. W/samuel care. No s/s of any acute distress. Call light w/I reach.

## 2020-12-04 NOTE — THERAPY EVALUATION
Patient Name: Eve Skelton  : 1960    MRN: [id number]                              Today's Date: 2020       Admit Date: 2020    Visit Dx:     ICD-10-CM ICD-9-CM   1. Closed 2-part displaced fracture of surgical neck of left humerus, initial encounter  S42.222A 812.01   2. Fall, initial encounter  W19.XXXA E888.9   3. Closed fracture of head of left humerus, initial encounter  S42.292A 812.09   4. Effusion of left shoulder joint  M25.412 719.01     Patient Active Problem List   Diagnosis   • Accidental fall   • Anxiety disorder   • Closed fracture of navicular bone of left wrist   • Depression   • Elbow osteomyelitis (CMS/HCC)   • Family history of cerebrovascular accident (CVA)   • Family history of diabetes mellitus   • Family history of malignant neoplasm of breast   • Headache, migraine   • Hypertension   • Insomnia, unspecified   • Morbid obesity (CMS/HCC)   • Restless legs syndrome   • Sleep apnea   • Sprain of tarsometatarsal ligament of left foot   • Fall   • Closed fracture of humerus   • Closed 2-part displaced fracture of surgical neck of left humerus     Past Medical History:   Diagnosis Date   • Anxiety    • Arthritis    • Breast cancer (CMS/HCC)    • Diabetes (CMS/HCC)    • Elevated cholesterol    • GERD (gastroesophageal reflux disease)    • Headache    • History of transfusion    • HTN (hypertension)    • RLS (restless legs syndrome)    • Sleep apnea      Past Surgical History:   Procedure Laterality Date   • BREAST BIOPSY     • BREAST CYST ASPIRATION     • BREAST CYST EXCISION     • BREAST LUMPECTOMY     • ORIF HUMERUS FRACTURE Left 12/3/2020    Procedure: HUMERUS PROXIMAL OPEN REDUCTION INTERNAL FIXATION;  Surgeon: Yvan Echols MD;  Location: [facility];  Service: Orthopedics;  Laterality: Left;     General Information     Row Name 20 1139          Physical Therapy Time and Intention    Document Type  evaluation  -HC     Mode of Treatment  physical therapy  -HC      Row Name 12/04/20 1139          General Information    Patient Profile Reviewed  yes  -HC     Prior Level of Function  independent:  -HC     Existing Precautions/Restrictions  -- NWB left UE  -HC     Row Name 12/04/20 1139          Living Environment    Lives With  spouse  -HC     Row Name 12/04/20 1139          Home Main Entrance    Number of Stairs, Main Entrance  -- has ramp to enter  -HC     Row Name 12/04/20 1139          Cognition    Orientation Status (Cognition)  oriented x 4  -HC       User Key  (r) = Recorded By, (t) = Taken By, (c) = Cosigned By    Initials Name Provider Type    HC Fariha Locke, PT Physical Therapist        Mobility     Row Name 12/04/20 1139          Bed Mobility    Bed Mobility  -- up in chair  -HC     Row Name 12/04/20 1139          Sit-Stand Transfer    Sit-Stand [place] (Transfers)  modified independence  -HC     Row Name 12/04/20 1139          Gait/Stairs (Locomotion)    [place] Level (Gait)  modified independence  -HC     Distance in Feet (Gait)  450 ft  -HC     Handrail Location (Stairs)  right side (ascending)  -HC     Number of Steps (Stairs)  4  -HC     Ascending Technique (Stairs)  step-to-step  -HC     Descending Technique (Stairs)  step-to-step  -HC     Comment (Gait/Stairs)  mild lateral sway noted that appears baseline, no other signs of LOB or safety concerns noted  -HC     Row Name 12/04/20 1139          Mobility    Extremity Weight-bearing Status  left upper extremity  -HC     Left Upper Extremity (Weight-bearing Status)  non weight-bearing (NWB)  -HC       User Key  (r) = Recorded By, (t) = Taken By, (c) = Cosigned By    Initials Name Provider Type    HC Fariha Locke, PT Physical Therapist        Obj/Interventions     Row Name 12/04/20 1140          Range of Motion Comprehensive    Comment, General Range of Motion  BUEs WFL  -HC     Row Name 12/04/20 1140          Strength Comprehensive (MMT)    Comment, General Manual Muscle Testing (MMT)  Assessment  BUEs grossly WFL  -HC     Row Name 12/04/20 1140          Balance    Balance Assessment  sitting static balance;sitting dynamic balance;standing static balance;standing dynamic balance  -HC     Static Sitting Balance  WFL  -HC     Dynamic Sitting Balance  WFL  -HC     Static Standing Balance  WFL  -HC     Dynamic Standing Balance  WFL  -HC       User Key  (r) = Recorded By, (t) = Taken By, (c) = Cosigned By    Initials Name Provider Type     Fariha Locke, PT Physical Therapist        Goals/Plan    No documentation.       Clinical Impression     Row Name 12/04/20 1140          Pain    Additional Documentation  Pain Scale: Numbers Pre/Post-Treatment (Group)  -     Row Name 12/04/20 1140          Pain Scale: Numbers Pre/Post-Treatment    Pretreatment Pain Rating  0/10 - no pain  -HC     Posttreatment Pain Rating  0/10 - no pain  -     Row Name 12/04/20 1140          Plan of Care Review    Outcome Summary  Pt is 59 yo female admitted after fall with findings of closed fracture head of left proximal humerus.  Pt now s/p ORIF proximal humerus on 12/3/2020.  Pt NWB left UE in sling  -     Row Name 12/04/20 1140          Therapy Assessment/Plan (PT)    Patient/Family Therapy Goals Statement (PT)  return home  -     Criteria for Skilled Interventions Met (PT)  no problems identified which require skilled intervention  -     Row Name 12/04/20 1140          Positioning and Restraints    Pre-Treatment Position  sitting in chair/recliner  -     Post Treatment Position  chair  -HC     In Chair  notified nsg;call light within reach  present  -       User Key  (r) = Recorded By, (t) = Taken By, (c) = Cosigned By    Initials Name Provider Type     Fariha Locke, PT Physical Therapist        Outcome Measures    No documentation.       Physical Therapy Education                 Title: PT OT SLP Therapies (Done)     Topic: Physical Therapy (Done)     Point: Mobility training (Done)      Learning Progress Summary           Patient Acceptance, E, VU by  at 12/4/2020 1142                               User Key     Initials Effective Dates Name Provider Type Discipline     04/17/20 -  Fariha Locke, PT Physical Therapist PT              PT Recommendation and Plan     Plan of Care Reviewed With: patient  Outcome Summary: Pt is 59 yo female admitted after fall with findings of closed fracture head of left proximal humerus.  Pt now s/p ORIF proximal humerus on 12/3/2020.  Pt NWB left UE in sling.  Pt able to complete transfers with indep and ambulate 450 ft with mod indep.  Pt with mild lateral sway noted that appears at baseline for pt, however no signs of LOB or other safety concerns noted.  Pt able to ascend/descend 4 steps using one handrail with SBA.  Pt can be up ad libby.  No further inpatient PT needs.  Plan home with .  PPE donned: mask with faceshield, gloves.     Time Calculation:   PT Charges     Row Name 12/04/20 1144             Time Calculation    Start Time  1015  -      Stop Time  1031  -      Time Calculation (min)  16 min  -      PT Received On  12/04/20  -         Time Calculation- PT    Total Timed Code Minutes- PT  0 minute(s)  -        User Key  (r) = Recorded By, (t) = Taken By, (c) = Cosigned By    Initials Name Provider Type     Fariha Locke, PT Physical Therapist        Therapy Charges for Today     Code Description Service Date Service Provider Modifiers Qty    93340176214  PT EVAL LOW COMPLEXITY 3 12/4/2020 Fariha Locke, PT GP 1               Fariha Locke PT  12/4/2020

## 2020-12-04 NOTE — THERAPY EVALUATION
Patient Name: Eve Skelton  : 1960    MRN: [id number]                              Today's Date: 2020       Admit Date: 2020    Visit Dx:     ICD-10-CM ICD-9-CM   1. Closed 2-part displaced fracture of surgical neck of left humerus, initial encounter  S42.222A 812.01   2. Fall, initial encounter  W19.XXXA E888.9   3. Closed fracture of head of left humerus, initial encounter  S42.292A 812.09   4. Effusion of left shoulder joint  M25.412 719.01     Patient Active Problem List   Diagnosis   • Accidental fall   • Anxiety disorder   • Closed fracture of navicular bone of left wrist   • Depression   • Elbow osteomyelitis (CMS/HCC)   • Family history of cerebrovascular accident (CVA)   • Family history of diabetes mellitus   • Family history of malignant neoplasm of breast   • Headache, migraine   • Hypertension   • Insomnia, unspecified   • Morbid obesity (CMS/HCC)   • Restless legs syndrome   • Sleep apnea   • Sprain of tarsometatarsal ligament of left foot   • Fall   • Closed fracture of humerus   • Closed 2-part displaced fracture of surgical neck of left humerus     Past Medical History:   Diagnosis Date   • Anxiety    • Arthritis    • Breast cancer (CMS/HCC)    • Diabetes (CMS/HCC)    • Elevated cholesterol    • GERD (gastroesophageal reflux disease)    • Headache    • History of transfusion    • HTN (hypertension)    • RLS (restless legs syndrome)    • Sleep apnea      Past Surgical History:   Procedure Laterality Date   • BREAST BIOPSY     • BREAST CYST ASPIRATION     • BREAST CYST EXCISION     • BREAST LUMPECTOMY     • ORIF HUMERUS FRACTURE Left 12/3/2020    Procedure: HUMERUS PROXIMAL OPEN REDUCTION INTERNAL FIXATION;  Surgeon: Yvan Echols MD;  Location: [facility] OR;  Service: Orthopedics;  Laterality: Left;     General Information     Row Name 20 1236          OT Time and Intention    Document Type  evaluation  -     Mode of Treatment  occupational therapy  -     Row Name  12/04/20 1236          General Information    Patient Profile Reviewed  yes  -     Prior Level of Function  independent:;ADL's;all household mobility;driving  -     Row Name 12/04/20 1236          Living Environment    Lives With  spouse  -     Row Name 12/04/20 1236          Home Main Entrance    Number of Stairs, Main Entrance  none  -     Row Name 12/04/20 1236          Stairs Within Home, Primary    Stairs, Within Home, Primary  6-8 steps to enter vs ramp.  Flight of steps to basement for laundry.  -     Row Name 12/04/20 1236          Cognition    Orientation Status (Cognition)  oriented x 4  -       User Key  (r) = Recorded By, (t) = Taken By, (c) = Cosigned By    Initials Name Provider Type    Sunshine Meehan, OT Occupational Therapist        Mobility/ADL's     Row Name 12/04/20 1238          Bed Mobility    Comment (Bed Mobility)  Pt up to chair therefore bed mobility not assessed  -     Row Name 12/04/20 1238          Transfers    Transfers  bed-chair transfer;sit-stand transfer;toilet transfer  -     Bed-Chair [place] (Transfers)  standby assist;modified independence  -     Sit-Stand [place] (Transfers)  modified independence  -     [place] Level (Toilet Transfer)  standby assist;modified independence  -     Row Name 12/04/20 1238          Toilet Transfer    Type (Toilet Transfer)  -- amb level xfer  -     Row Name 12/04/20 1238          Activities of Daily Living    BADL Assessment/Intervention  upper body dressing;lower body dressing;toileting  -     Row Name 12/04/20 1238          Mobility    Extremity Weight-bearing Status  left upper extremity  -     Left Upper Extremity (Weight-bearing Status)  non weight-bearing (NWB)  -     Row Name 12/04/20 1238          Upper Body Dressing Assessment/Training    [place] Level (Upper Body Dressing)  doff;don;pull-over garment;supervision;verbal cues;set up  -     Position (Upper Body Dressing)  supported  sitting  -     Row Name 12/04/20 1238          Lower Body Dressing Assessment/Training    [place]odore Level (Lower Body Dressing)  lower body dressing skills;don;doff;pants/bottoms;socks;set up;supervision;verbal cues  -     Position (Lower Body Dressing)  supported sitting  -     Row Name 12/04/20 1238          Toileting Assessment/Training    Theodore Level (Toileting)  toileting skills;adjust/manage clothing;perform perineal hygiene;standby assist;verbal cues  -     Assistive Devices (Toileting)  commode  -     Position (Toileting)  unsupported sitting  -DR       User Key  (r) = Recorded By, (t) = Taken By, (c) = Cosigned By    Initials Name Provider Type    Sunshine Meehan, OT Occupational Therapist        Obj/Interventions     Row Name 12/04/20 1240          Sensory Assessment (Somatosensory)    Sensory Subjective Reports  numbness Reported numbness in LUE from nerve block  -     Row Name 12/04/20 1240          Vision Assessment/Intervention    Visual Impairment/Limitations  WFL;corrective lenses full-time  -     Row Name 12/04/20 1240          Range of Motion Comprehensive    Comment, General Range of Motion  RUE AROM WFL, LUE NT  -     [facility] Name 12/04/20 1240          Strength Comprehensive (MMT)    Comment, General Manual Muscle Testing (MMT) Assessment  RUE WFL, FARHEEN RUTHERFORD  -     Row Name 12/04/20 1240          Shoulder (Therapeutic Exercise)    Shoulder (Therapeutic Exercise)  pendulum exercises  -     Shoulder Pendulum Exercises (Therapeutic Exercise)  standing;left vc's for instruction, issued handout  -     Row Name 12/04/20 1240          Elbow/Forearm (Therapeutic Exercise)    Elbow/Forearm (Therapeutic Exercise)  AROM (active range of motion)  -     Elbow/Forearm AROM (Therapeutic Exercise)  left;10 repetitions;flexion;extension  -     Row Name 12/04/20 1240          Wrist (Therapeutic Exercise)    Wrist (Therapeutic Exercise)  AROM (active range of motion)  -      Wrist AROM (Therapeutic Exercise)  left;flexion;extension;10 repetitions  -     Row Name 12/04/20 1240          Hand (Therapeutic Exercise)    Hand (Therapeutic Exercise)  AROM (active range of motion)  -     Hand AROM/AAROM (Therapeutic Exercise)  left;finger flexion;finger extension;10 repetitions  -     Row Name 12/04/20 1240          Balance    Static Sitting Balance  WFL  -DR     Dynamic Sitting Balance  WFL  -DR     Static Standing Balance  WFL  -DR     Dynamic Standing Balance  WFL  -DR     Balance Interventions  occupation based/functional task  -     Row Name 12/04/20 1240          Therapeutic Exercise    Therapeutic Exercise  shoulder;elbow/forearm;wrist;hand  -DR       User Key  (r) = Recorded By, (t) = Taken By, (c) = Cosigned By    Initials Name Provider Type    Sunshine Meehan, LUBA Occupational Therapist        Goals/Plan    No documentation.       Clinical Impression     Row Name 12/04/20 1242          Pain Assessment    Additional Documentation  Pain Scale: Numbers Pre/Post-Treatment (Group)  -     Row Name 12/04/20 1242          Pain Scale: Numbers Pre/Post-Treatment    Pretreatment Pain Rating  2/10  -DR     Posttreatment Pain Rating  2/10  -DR     Pain Location - Side  Left  -DR     Pain Location  shoulder  -DR     Row Name 12/04/20 1242          Plan of Care Review    Plan of Care Reviewed With  patient  -DR     Outcome Summary  Pt is 61 yo female admitted after fall with findings of closed fracture head of left proximal humerus. Pt now s/p ORIF proximal humerus on 12/3/2020. Pt NWB left UE in sling. Pt lives with  in [facility] with basement, ramp vs 6-8 steps to enter.  Pt has tub/sh and able to borrow SC if needed at home.  Pt is mod I for CTS and SBA/mod I for amb level functional xfers/mobility in room no AD.  Pt completed TBD with sup/carrasquillo and vc's for instruction in adapted techniques.  Pt is SBA/mod I for toileting.  Pt educated in doff/don sling and completed with vc's only.   Pt educated in pendulum exercises for L shd and complete with vc ANA' for instruction.  Handout issued.  OT recommending home with HH OT and assist of  as needed.  Plan to dc today so eval only. PPE worn gloves, mask, goggles.  -     Row Name 12/04/20 1242          Therapy Assessment/Plan (OT)    Therapy Frequency (OT)  evaluation only  -     Row Name 12/04/20 1242          Therapy Plan Review/Discharge Plan (OT)    Anticipated Discharge Disposition (OT)  home with assist;home with home health  -     Row Name 12/04/20 1242          Positioning and Restraints    Pre-Treatment Position  sitting in chair/recliner  -     Post Treatment Position  chair  -DR     In Chair  call light within reach  -       User Key  (r) = Recorded By, (t) = Taken By, (c) = Cosigned By    Initials Name Provider Type    Sunshine Meehan, OT Occupational Therapist        Outcome Measures    No documentation.       Occupational Therapy Education                 Title: PT OT SLP Therapies (Done)     Topic: Occupational Therapy (Done)     Point: ADL training (Done)     Description:   Instruct learner(s) on proper safety adaptation and remediation techniques during self care or transfers.   Instruct in proper use of assistive devices.              Learning Progress Summary           Patient Acceptance, E,TB, VU by  at 12/4/2020 1246                   Point: Home exercise program (Done)     Description:   Instruct learner(s) on appropriate technique for monitoring, assisting and/or progressing therapeutic exercises/activities.              Learning Progress Summary           Patient Acceptance, E,TB, VU by  at 12/4/2020 1246                   Point: Precautions (Done)     Description:   Instruct learner(s) on prescribed precautions during self-care and functional transfers.              Learning Progress Summary           Patient Acceptance, E,TB, VU by  at 12/4/2020 1246                   Point: Body mechanics (Done)      Description:   Instruct learner(s) on proper positioning and spine alignment during self-care, functional mobility activities and/or exercises.              Learning Progress Summary           Patient Acceptance, E,TB, VU by  at 12/4/2020 1246                               User Key     Initials Effective Dates Name Provider Type Fabiana GUTIERREZ 08/24/20 -  Sunshine De León OT Occupational Therapist OT              OT Recommendation and Plan  Therapy Frequency (OT): evaluation only  Plan of Care Review  Plan of Care Reviewed With: patient  Outcome Summary: Pt is 61 yo female admitted after fall with findings of closed fracture head of left proximal humerus. Pt now s/p ORIF proximal humerus on 12/3/2020. Pt NWB left UE in sling. Pt lives with  in [facility] with basement, ramp vs 6-8 steps to enter.  Pt has tub/sh and able to borrow SC if needed at home.  Pt is mod I for CTS and SBA/mod I for amb level functional xfers/mobility in room no AD.  Pt completed TBD with sup/carrasquillo and vc's for instruction in adapted techniques.  Pt is SBA/mod I for toileting.  Pt educated in doff/don sling and completed with vc's only.  Pt educated in pendulum exercises for L shd and complete with SBA, vc' for instruction.  Handout issued.  OT recommending home with HH OT and assist of  as needed.  Plan to dc today so eval only. PPE worn gloves, mask, goggles.     Time Calculation:   Time Calculation- OT     Row Name 12/04/20 1247 12/04/20 1246          Time Calculation- OT    OT Start Time  --  0835  -     OT Stop Time  --  0907  -     OT Time Calculation (min)  --  32 min  -     Total Timed Code Minutes- OT  15 minute(s)  -  --     OT Received On  --  12/04/20  -       User Key  (r) = Recorded By, (t) = Taken By, (c) = Cosigned By    Initials Name Provider Type    Sunshine Meehan OT Occupational Therapist        Therapy Charges for Today     Code Description Service Date Service Provider Modifiers Qty     50657009718 HC OT EVAL LOW COMPLEXITY 4 12/4/2020 Sunshine De León OT GO 1    [id number]  OT THER PROC EA 15 MIN 12/4/2020 Sunshine De León OT GO 1               Sunshine De León OT  12/4/2020

## 2020-12-04 NOTE — DISCHARGE SUMMARY
Date of admission: 12/2/2020    Date of Discharge:  12/4/2020    Discharge Diagnosis: Left 2 part proximal humerus fracture    Procedures Performed    Procedure(s):  HUMERUS PROXIMAL OPEN REDUCTION INTERNAL FIXATION of left 2 part proximal humerus fracture  -------------------       Presenting Problem/History of Present Illness  Active Hospital Problems    Diagnosis  POA   • **Closed 2-part displaced fracture of surgical neck of left humerus [S42.222A]  Unknown   • Fall [W19.XXXA]  Yes   • Closed fracture of humerus [S42.309A]  Yes      Resolved Hospital Problems   No resolved problems to display.          Hospital Course  Patient is a 60 y.o. female presented with left 2 part proximal humerus fracture.  Underwent open reduction and fixation.  Did well with his postop.  Was moderate pain.  Moving fingers well.  Mild decrease sensation.        Consults:   Consults     Date and Time Order Name Status Description    12/3/2020 1839 Inpatient Consult to Hospitalist      12/2/2020 2155 Inpatient Orthopedic Surgery Consult Completed     12/2/2020 1902 Hospitalist (on-call MD unless specified) Completed     12/2/2020 1837 Ortho (on-call MD unless specified) Completed           Pertinent Test Results:     Condition on Discharge:  Stable            Discharge Disposition  Home.  Does not need home health therapy yet    Discharge Medications     Discharge Medications      New Medications      Instructions Start Date   oxyCODONE-acetaminophen 5-325 MG per tablet  Commonly known as: PERCOCET   1 or 2 tablets p.o. every 4 hours as needed pain         Continue These Medications      Instructions Start Date   aspirin 81 MG tablet   81 mg, Oral, Daily      Calcium 500 + D3 250-500 MG-UNIT chewable tablet  Generic drug: Ca Phosphate-Cholecalciferol   1 tablet, Oral, Daily      fish oil 1000 MG capsule capsule   1,000 mg, Oral, 2 Times Daily With Meals      gabapentin 300 MG capsule  Commonly known as: NEURONTIN   600 mg, Oral, Daily,  Afternoon       gabapentin 300 MG capsule  Commonly known as: NEURONTIN   900 mg, Oral, 2 times daily, Morning and Night      hydroCHLOROthiazide 25 MG tablet  Commonly known as: HYDRODIURIL   25 mg, Oral, Daily      L-Methylfolate-Algae 15-90.314 MG capsule   1 capsule, Oral, Daily      LORazepam 1 MG tablet  Commonly known as: ATIVAN   1 mg, Oral, Daily PRN      meloxicam 15 MG tablet  Commonly known as: MOBIC   15 mg, Oral, Daily      metFORMIN  MG 24 hr tablet  Commonly known as: GLUCOPHAGE-XR   500 mg, Oral      omeprazole 20 MG capsule  Commonly known as: priLOSEC   20 mg, Oral, Daily      polyethyl glycol-propyl glycol 0.4-0.3 % solution ophthalmic solution  Commonly known as: SYSTANE   2 drops, Both Eyes, Every 1 Hour PRN      pravastatin 40 MG tablet  Commonly known as: PRAVACHOL   40 mg, Oral, Nightly      topiramate 100 MG tablet  Commonly known as: TOPAMAX   100 mg, Oral, 2 Times Daily      Turmeric 500 MG tablet   1 tablet, Oral, Daily      valsartan 320 MG tablet  Commonly known as: DIOVAN   320 mg, Oral, Daily      Viibryd 40 MG tablet tablet  Generic drug: vilazodone   40 mg, Oral, Daily      Vitamin D-1000 Max St 25 MCG (1000 UT) tablet  Generic drug: Cholecalciferol   2,000 Units, Oral, Daily      Wellbutrin  MG 24 hr tablet  Generic drug: buPROPion XL   1 tablet, Oral, Every Morning             Discharge Diet:   Diet Instructions     Diet: Regular      Discharge Diet: Regular      Regular    Activity at Discharge: Pendulum exercises only.  May shower postop day 4    Follow-up Appointments  Future Appointments   Date Time Provider Department [place]   12/8/2020  8:15 AM TEO HENRIQUE 3  [facility] MAMMO [facility]   12/8/2020  8:30 AM TEO DEXA 1  [facility] DEXA [facility]   1/27/2021 10:45 AM Seipel, Joseph F, MD [facility] NA [facility]     Additional Instructions for the Follow-ups that You Need to Schedule     Call MD for problems / concerns.   As directed      Instructions: For any wound drainage or fever    Order  Comments: Instructions: For any wound drainage or fever          Discharge Follow-up with Specialty: lizet 10-12 days postop   As directed      Specialty: lizet 10-12 days postop               Test Results Pending at Discharge       Yvan Echols MD  12/04/20  07:16 EST

## 2020-12-04 NOTE — ANESTHESIA POSTPROCEDURE EVALUATION
Patient: Eve Skelton    Procedure Summary     Date: 12/03/20 Room / Location: [facility] OR 11 / [facility] MAIN OR    Anesthesia Start: 1455 Anesthesia Stop: 1637    Procedure: HUMERUS PROXIMAL OPEN REDUCTION INTERNAL FIXATION (Left Arm Upper) Diagnosis:       Closed 2-part displaced fracture of surgical neck of left humerus, initial encounter      (Closed 2-part displaced fracture of surgical neck of left humerus, initial encounter [S42.222A])    Surgeon: Yvan Echols MD Provider: Nestor Angel MD    Anesthesia Type: general with block ASA Status: 3          Anesthesia Type: general with block    Vitals  Vitals Value Taken Time   /66 12/03/20 1730   Temp 98.7 °F (37.1 °C) 12/03/20 1729   Pulse 67 12/03/20 1731   Resp 13 12/03/20 1729   SpO2 96 % 12/03/20 1731   Vitals shown include unvalidated device data.        Post Anesthesia Care and Evaluation    Patient location during evaluation: PACU  Patient participation: complete - patient participated  Level of consciousness: awake  Pain scale: See nurse's notes for pain score.  Pain management: adequate  Airway patency: patent  Anesthetic complications: No anesthetic complications  PONV Status: none  Cardiovascular status: acceptable  Respiratory status: acceptable  Hydration status: acceptable    Comments: Patient seen and examined postoperatively; vital signs stable; SpO2 greater than or equal to 90%; cardiopulmonary status stable; nausea/vomiting adequately controlled; pain adequately controlled; no apparent anesthesia complications; patient discharged from anesthesia care when discharge criteria were met

## 2020-12-04 NOTE — PLAN OF CARE
Goal Outcome Evaluation:  Plan of Care Reviewed With: patient  Progress: improving  Outcome Summary: Pt is 59 yo female admitted after fall with findings of closed fracture head of left proximal humerus.  Pt now s/p ORIF proximal humerus on 12/3/2020.  Pt NWB left UE in sling.  Pt able to complete transfers with indep and ambulate 450 ft with mod indep.  Pt with mild lateral sway noted that appears at baseline for pt, however no signs of LOB or other safety concerns noted.  Pt able to ascend/descend 4 steps using one handrail with SBA.  Pt can be up ad libby.  No further inpatient PT needs.  Plan home with .  PPE donned: mask with faceshield, gloves.

## 2020-12-04 NOTE — PLAN OF CARE
Goal Outcome Evaluation:  Plan of Care Reviewed With: patient  Progress: improving  Outcome Summary: pt going home today, doing well with PT/OT. no pain as block still in place.

## 2020-12-04 NOTE — PLAN OF CARE
Goal Outcome Evaluation:  Plan of Care Reviewed With: patient  Progress: improving  Outcome Summary: Pt is 61 yo female admitted after fall with findings of closed fracture head of left proximal humerus. Pt now s/p ORIF proximal humerus on 12/3/2020. Pt NWB left UE in sling. Pt lives with  in [facility] with basement, ramp vs 6-8 steps to enter.  Pt has tub/sh and able to borrow SC if needed at home.  Pt is mod I for CTS and SBA/mod I for amb level functional xfers/mobility in room no AD.  Pt completed TBD with sup/carrasquillo and vc's for instruction in adapted techniques.  Pt is SBA/mod I for toileting.  Pt educated in doff/don sling and completed with vc's only.  Pt educated in pendulum exercises for L shd and complete with SBA, vc' for instruction.  Handout issued.  OT recommending home with HH OT and assist of  as needed.  Plan to dc today so eval only. PPE worn gloves, mask, goggles.

## 2020-12-07 NOTE — PROGRESS NOTES
Case Management Discharge Note      Final Note: Home    Provided Post Acute Provider List?: N/A  N/A Provider List Comment: no needs identified at this time    Selected Continued Care - Discharged on 12/4/2020 Admission date: 12/2/2020 - Discharge disposition: Home or Self Care              Final Discharge Disposition Code: 01 - home or self-care

## 2020-12-08 ENCOUNTER — APPOINTMENT (OUTPATIENT)
Dept: MAMMOGRAPHY | Facility: HOSPITAL | Age: 60
End: 2020-12-08

## 2020-12-08 ENCOUNTER — APPOINTMENT (OUTPATIENT)
Dept: BONE DENSITY | Facility: HOSPITAL | Age: 60
End: 2020-12-08

## 2021-01-27 ENCOUNTER — OFFICE VISIT (OUTPATIENT)
Dept: NEUROLOGY | Facility: CLINIC | Age: 61
End: 2021-01-27

## 2021-01-27 VITALS
HEIGHT: 63 IN | SYSTOLIC BLOOD PRESSURE: 136 MMHG | DIASTOLIC BLOOD PRESSURE: 83 MMHG | TEMPERATURE: 97.1 F | HEART RATE: 82 BPM | WEIGHT: 236.4 LBS | BODY MASS INDEX: 41.89 KG/M2

## 2021-01-27 DIAGNOSIS — G25.81 RLS (RESTLESS LEGS SYNDROME): ICD-10-CM

## 2021-01-27 DIAGNOSIS — G47.33 OBSTRUCTIVE SLEEP APNEA SYNDROME: Primary | ICD-10-CM

## 2021-01-27 DIAGNOSIS — G43.009 MIGRAINE WITHOUT AURA AND WITHOUT STATUS MIGRAINOSUS, NOT INTRACTABLE: ICD-10-CM

## 2021-01-27 PROBLEM — S42.222D: Status: ACTIVE | Noted: 2021-01-27

## 2021-01-27 PROCEDURE — 99213 OFFICE O/P EST LOW 20 MIN: CPT | Performed by: PSYCHIATRY & NEUROLOGY

## 2021-01-27 NOTE — PROGRESS NOTES
Sleep medicine follow-up visit    Eve Skelton   1960  60 y.o. female   DATE OF SERVICE: 1/27/2021     Yearly f/u for cpap compliance, patient uses a nasal mask and goes through [employer] for supplies.     SLEEP TESTING HISTORY:    New machine about 2017  On NPSG at [facility] [place] , 4- patient had Moderate obstructive sleep apnea syndrome with apnea-hypopnea index of 17.0 per sleep hour, minimum SpO2 of 86%    The compliance data reviewed and the patient is on CPAP therapy at 10-20 cm/H2O and compliance data indicates excellent compliance with 96% usage for more than 4 hours with an average usage of 8 hours 25 minutes. AHI down to 3.1 with CPAP therapy and mean CPAP pressure 10.9 cm of water.      The patient's hypersomnia also resolved with [place] Sleepiness Scale score of 8 with CPAP therapy.  The patient feels great and is benefiting from it and is compliant.     RLS, doing well with Gabapentin    Migraine improved with Topamax.     Obesity, BMI-41.8 stable    [place]WORTH SLEEPINESS SCALE  Sitting and reading 0 WatchingTV 1  Sitting, inactive, in a public place 1  As a passenger in a car for 1 hour w/o a break  0  Lying down to rest in the afternoon  3  Sitting and talking to someone  0  Sitting quietly after a lunch  3  In a car, while stopped for traffic or a light  0  Total 8    Review of Systems   Constitutional: Negative for activity change and appetite change.   HENT: Negative for sinus pressure and sinus pain.    Eyes: Negative for discharge and itching.   Respiratory: Positive for apnea. Negative for cough and shortness of breath.    Gastrointestinal: Negative for abdominal pain and nausea.   Endocrine: Negative for cold intolerance and heat intolerance.   Genitourinary: Negative for urgency.   Musculoskeletal: Positive for gait problem. Negative for back pain, neck pain and neck stiffness.   Neurological: Positive for numbness and headaches. Negative for light-headedness.    Psychiatric/Behavioral: Negative for dysphoric mood and hallucinations.     I reviewed and addressed ROS entered by MA.      The following portions of the patient's history were reviewed and updated as appropriate: allergies, current medications, past family history, past medical history, past social history, past surgical history and problem list.      Family History   Problem Relation Age of Onset   • No Known Problems Mother    • Breast cancer Father    • Breast cancer Sister    • Breast cancer Brother    • No Known Problems Daughter    • No Known Problems Son    • No Known Problems Maternal Grandmother    • No Known Problems Paternal Grandmother    • No Known Problems Maternal Aunt    • No Known Problems Paternal Aunt        Past Medical History:   Diagnosis Date   • Anxiety    • Arthritis    • Breast cancer (CMS/HCC)    • Diabetes (CMS/HCC)    • Elevated cholesterol    • GERD (gastroesophageal reflux disease)    • Headache    • History of transfusion    • HTN (hypertension)    • RLS (restless legs syndrome)    • Sleep apnea        Social History     Socioeconomic History   • Marital status:      Spouse name: Not on file   • Number of children: Not on file   • Years of education: Not on file   • Highest education level: Not on file   Tobacco Use   • Smoking status: Current Every Day Smoker     Packs/day: 0.50     Types: Cigarettes   • Smokeless tobacco: Never Used   Substance and Sexual Activity   • Alcohol use: Yes     Alcohol/week: 1.0 standard drinks     Types: 1 Glasses of wine per week   • Drug use: Never   • Sexual activity: Defer         Current Outpatient Medications:   •  aspirin 81 MG tablet, Take 81 mg by mouth Daily., Disp: , Rfl:   •  buPROPion XL (WELLBUTRIN XL) 300 MG 24 hr tablet, Take 1 tablet by mouth Every Morning., Disp: , Rfl:   •  Ca Phosphate-Cholecalciferol (Calcium 500 + D3) 250-500 MG-UNIT chewable tablet, Chew 1 tablet Daily., Disp: , Rfl:   •  Cholecalciferol (VITAMIN D-1000  MAX ST) 25 MCG (1000 UT) tablet, Take 2,000 Units by mouth Daily., Disp: , Rfl:   •  gabapentin (NEURONTIN) 300 MG capsule, Take 900 mg by mouth 2 (two) times a day. Morning and Night, Disp: , Rfl:   •  gabapentin (NEURONTIN) 300 MG capsule, Take 600 mg by mouth Daily. Afternoon, Disp: , Rfl:   •  hydroCHLOROthiazide (HYDRODIURIL) 25 MG tablet, Take 25 mg by mouth Daily., Disp: , Rfl:   •  L-Methylfolate-Algae 15-90.314 MG capsule, Take 1 capsule by mouth Daily., Disp: , Rfl:   •  LORazepam (ATIVAN) 1 MG tablet, Take 1 mg by mouth Daily As Needed for Anxiety., Disp: , Rfl:   •  meloxicam (MOBIC) 15 MG tablet, Take 15 mg by mouth Daily., Disp: , Rfl:   •  metFORMIN ER (GLUCOPHAGE-XR) 500 MG 24 hr tablet, Take 500 mg by mouth., Disp: , Rfl:   •  Omega-3 Fatty Acids (FISH OIL) 1000 MG capsule capsule, Take 1,000 mg by mouth 2 (Two) Times a Day With Meals., Disp: , Rfl:   •  omeprazole (priLOSEC) 20 MG capsule, Take 20 mg by mouth Daily., Disp: , Rfl:   •  oxyCODONE-acetaminophen (PERCOCET) 5-325 MG per tablet, 1 or 2 tablets p.o. every 4 hours as needed pain, Disp: 40 tablet, Rfl: 0  •  polyethyl glycol-propyl glycol (SYSTANE) 0.4-0.3 % solution ophthalmic solution, Administer 2 drops to both eyes Every 1 (One) Hour As Needed., Disp: , Rfl:   •  pravastatin (PRAVACHOL) 40 MG tablet, Take 40 mg by mouth Every Night., Disp: , Rfl:   •  topiramate (TOPAMAX) 100 MG tablet, Take 100 mg by mouth 2 (Two) Times a Day., Disp: , Rfl:   •  Turmeric 500 MG tablet, Take 1 tablet by mouth Daily., Disp: , Rfl:   •  valsartan (DIOVAN) 320 MG tablet, Take 320 mg by mouth Daily., Disp: , Rfl:   •  vilazodone (VIIBRYD) 40 MG tablet tablet, Take 40 mg by mouth Daily., Disp: , Rfl:     Allergies   Allergen Reactions   • Vancomycin Other (See Comments)     Ringing of ears         PHYSICAL EXAMINATION:  Vitals:    01/27/21 1056   BP: 136/83   Pulse: 82   Temp: 97.1 °F (36.2 °C)      Body mass index is 41.88 kg/m².       HEENT: Normal.       EXTREMITIES: No edema.     Diagnoses and all orders for this visit:    1. Obstructive sleep apnea syndrome (Primary)    2. RLS (restless legs syndrome)    3. Migraine without aura and without status migrainosus, not intractable       Doing well with CPAP, continue current pressure    Weight stable pt encouraged to lose weight.           This document has been electronically signed by Joseph Seipel, MD on January 27, 2021 11:17 EST

## 2021-01-28 ENCOUNTER — TELEPHONE (OUTPATIENT)
Dept: NEUROLOGY | Facility: CLINIC | Age: 61
End: 2021-01-28

## 2021-01-28 DIAGNOSIS — G47.33 OBSTRUCTIVE SLEEP APNEA SYNDROME: Primary | ICD-10-CM

## 2021-01-28 NOTE — TELEPHONE ENCOUNTER
----- Message from Joseph F Seipel, MD sent at 1/27/2021 11:16 AM EST -----   nasal mask and goes through [facility] for supplies.

## 2021-02-15 ENCOUNTER — APPOINTMENT (OUTPATIENT)
Dept: MAMMOGRAPHY | Facility: HOSPITAL | Age: 61
End: 2021-02-15

## 2021-02-15 ENCOUNTER — APPOINTMENT (OUTPATIENT)
Dept: BONE DENSITY | Facility: HOSPITAL | Age: 61
End: 2021-02-15

## 2021-04-12 ENCOUNTER — HOSPITAL ENCOUNTER (OUTPATIENT)
Dept: BONE DENSITY | Facility: HOSPITAL | Age: 61
Discharge: HOME | End: 2021-04-12

## 2021-04-12 ENCOUNTER — HOSPITAL ENCOUNTER (OUTPATIENT)
Dept: MAMMOGRAPHY | Facility: HOSPITAL | Age: 61
Discharge: HOME | End: 2021-04-12

## 2021-04-12 DIAGNOSIS — Z12.31 SCREENING MAMMOGRAM, ENCOUNTER FOR: ICD-10-CM

## 2021-04-12 DIAGNOSIS — Z13.820 ENCOUNTER FOR SCREENING FOR OSTEOPOROSIS: ICD-10-CM

## 2021-04-12 PROCEDURE — 77067 SCR MAMMO BI INCL CAD: CPT

## 2021-04-12 PROCEDURE — 77080 DXA BONE DENSITY AXIAL: CPT

## 2021-04-12 PROCEDURE — 77063 BREAST TOMOSYNTHESIS BI: CPT

## 2021-04-21 ENCOUNTER — HOSPITAL ENCOUNTER (OUTPATIENT)
Dept: MAMMOGRAPHY | Facility: HOSPITAL | Age: 61
Discharge: HOME | End: 2021-04-21

## 2021-04-21 ENCOUNTER — HOSPITAL ENCOUNTER (OUTPATIENT)
Dept: ULTRASOUND IMAGING | Facility: HOSPITAL | Age: 61
Discharge: HOME | End: 2021-04-21

## 2021-04-21 DIAGNOSIS — R92.8 ABNORMALITY OF LEFT BREAST ON SCREENING MAMMOGRAM: ICD-10-CM

## 2021-04-21 PROCEDURE — 76642 ULTRASOUND BREAST LIMITED: CPT

## 2021-06-08 ENCOUNTER — LAB (OUTPATIENT)
Dept: LAB | Facility: HOSPITAL | Age: 61
End: 2021-06-08

## 2021-06-08 ENCOUNTER — HOSPITAL ENCOUNTER (OUTPATIENT)
Dept: CARDIOLOGY | Facility: HOSPITAL | Age: 61
Discharge: HOME | End: 2021-06-08

## 2021-06-08 ENCOUNTER — TRANSCRIBE ORDERS (OUTPATIENT)
Dept: ADMINISTRATIVE | Facility: HOSPITAL | Age: 61
End: 2021-06-08

## 2021-06-08 DIAGNOSIS — M75.122 COMPLETE TEAR OF LEFT ROTATOR CUFF, UNSPECIFIED WHETHER TRAUMATIC: ICD-10-CM

## 2021-06-08 DIAGNOSIS — M75.122 COMPLETE TEAR OF LEFT ROTATOR CUFF, UNSPECIFIED WHETHER TRAUMATIC: Primary | ICD-10-CM

## 2021-06-08 DIAGNOSIS — Z01.818 PREOP TESTING: ICD-10-CM

## 2021-06-08 LAB
ANION GAP SERPL CALCULATED.3IONS-SCNC: 12.1 MMOL/L (ref 5–15)
BASOPHILS # BLD AUTO: 0.04 10*3/MM3 (ref 0–0.2)
BASOPHILS NFR BLD AUTO: 0.5 % (ref 0–1.5)
BUN SERPL-MCNC: 16 MG/DL (ref 8–23)
BUN/CREAT SERPL: 23.5 (ref 7–25)
CALCIUM SPEC-SCNC: 9.3 MG/DL (ref 8.6–10.5)
CHLORIDE SERPL-SCNC: 106 MMOL/L (ref 98–107)
CO2 SERPL-SCNC: 24.9 MMOL/L (ref 22–29)
CREAT SERPL-MCNC: 0.68 MG/DL (ref 0.57–1)
DEPRECATED RDW RBC AUTO: 44.6 FL (ref 37–54)
EOSINOPHIL # BLD AUTO: 0.28 10*3/MM3 (ref 0–0.4)
EOSINOPHIL NFR BLD AUTO: 3.8 % (ref 0.3–6.2)
ERYTHROCYTE [DISTWIDTH] IN BLOOD BY AUTOMATED COUNT: 13 % (ref 12.3–15.4)
GFR SERPL CREATININE-BSD FRML MDRD: 88 ML/MIN/1.73
GLUCOSE SERPL-MCNC: 106 MG/DL (ref 65–99)
HCT VFR BLD AUTO: 43.8 % (ref 34–46.6)
HGB BLD-MCNC: 14.6 G/DL (ref 12–15.9)
IMM GRANULOCYTES # BLD AUTO: 0.02 10*3/MM3 (ref 0–0.05)
IMM GRANULOCYTES NFR BLD AUTO: 0.3 % (ref 0–0.5)
LYMPHOCYTES # BLD AUTO: 1.66 10*3/MM3 (ref 0.7–3.1)
LYMPHOCYTES NFR BLD AUTO: 22.4 % (ref 19.6–45.3)
MCH RBC QN AUTO: 31.1 PG (ref 26.6–33)
MCHC RBC AUTO-ENTMCNC: 33.3 G/DL (ref 31.5–35.7)
MCV RBC AUTO: 93.2 FL (ref 79–97)
MONOCYTES # BLD AUTO: 0.53 10*3/MM3 (ref 0.1–0.9)
MONOCYTES NFR BLD AUTO: 7.1 % (ref 5–12)
NEUTROPHILS NFR BLD AUTO: 4.89 10*3/MM3 (ref 1.7–7)
NEUTROPHILS NFR BLD AUTO: 65.9 % (ref 42.7–76)
NRBC BLD AUTO-RTO: 0 /100 WBC (ref 0–0.2)
PLATELET # BLD AUTO: 142 10*3/MM3 (ref 140–450)
PMV BLD AUTO: 10.2 FL (ref 6–12)
POTASSIUM SERPL-SCNC: 3.8 MMOL/L (ref 3.5–5.2)
RBC # BLD AUTO: 4.7 10*6/MM3 (ref 3.77–5.28)
SODIUM SERPL-SCNC: 143 MMOL/L (ref 136–145)
WBC # BLD AUTO: 7.42 10*3/MM3 (ref 3.4–10.8)

## 2021-06-08 PROCEDURE — 36415 COLL VENOUS BLD VENIPUNCTURE: CPT

## 2021-06-08 PROCEDURE — 93005 ELECTROCARDIOGRAM TRACING: CPT | Performed by: ORTHOPAEDIC SURGERY

## 2021-06-08 PROCEDURE — 80048 BASIC METABOLIC PNL TOTAL CA: CPT

## 2021-06-08 PROCEDURE — 85025 COMPLETE CBC W/AUTO DIFF WBC: CPT

## 2021-06-08 PROCEDURE — 93010 ELECTROCARDIOGRAM REPORT: CPT | Performed by: INTERNAL MEDICINE

## 2021-06-12 LAB — QT INTERVAL: 358 MS

## 2021-07-23 ENCOUNTER — APPOINTMENT (OUTPATIENT)
Dept: ULTRASOUND IMAGING | Facility: HOSPITAL | Age: 61
End: 2021-07-23

## 2021-07-23 ENCOUNTER — APPOINTMENT (OUTPATIENT)
Dept: MAMMOGRAPHY | Facility: HOSPITAL | Age: 61
End: 2021-07-23

## 2021-08-12 ENCOUNTER — HOSPITAL ENCOUNTER (OUTPATIENT)
Dept: ULTRASOUND IMAGING | Facility: HOSPITAL | Age: 61
Discharge: HOME | End: 2021-08-12

## 2021-08-12 ENCOUNTER — HOSPITAL ENCOUNTER (OUTPATIENT)
Dept: MAMMOGRAPHY | Facility: HOSPITAL | Age: 61
Discharge: HOME | End: 2021-08-12

## 2021-08-12 DIAGNOSIS — Z09 FOLLOW UP: ICD-10-CM

## 2021-08-12 PROCEDURE — 77065 DX MAMMO INCL CAD UNI: CPT

## 2021-08-12 PROCEDURE — 76642 ULTRASOUND BREAST LIMITED: CPT

## 2021-08-12 PROCEDURE — G0279 TOMOSYNTHESIS, MAMMO: HCPCS

## 2021-11-22 ENCOUNTER — TRANSCRIBE ORDERS (OUTPATIENT)
Dept: ADMINISTRATIVE | Facility: HOSPITAL | Age: 61
End: 2021-11-22

## 2021-11-22 DIAGNOSIS — Z12.31 ENCOUNTER FOR SCREENING MAMMOGRAM FOR MALIGNANT NEOPLASM OF BREAST: Primary | ICD-10-CM

## 2022-01-25 NOTE — PROGRESS NOTES
"Chief Complaint  Sleep Apnea    Subjective          Eve Skelton presents to [facility] NEUROLOGY  History of Present Illness  Yearly f/u for cpap compliance,paptient states she sleeps well pap machine and benefiting from therapy,patient states her humidifier may not be working. patient uses a nasal mask and goes through [employer] [facility] [employer] for supplies. per pt she has stopped smoking x's 10 days     SLEEP TESTING HISTORY:    New machine about 2017  On NPSG at [facility] [place] , 4- patient had Moderate obstructive sleep apnea syndrome with apnea-hypopnea index of 17.0 per sleep hour, minimum SpO2 of 86%    PAP download:  The patient is on CPAP therapy at 10-20 cm/H2O.   Data indicates Excellent compliance. With 90% usage for more than 4 hours with an average usage of 8 hours 30 minutes. AHI down to 3.7 .  Average pressures 11.  Average large leak 3min.     The patient's hypersomnia has resolved       [place] Sleepiness Scale:  Sitting and reading 1 WatchingTV 2  Sitting, inactive, in a public place 0  As a passenger in a car for 1 hour w/o a break  0  Lying down to rest in the afternoon  3  Sitting and talking to someone  0  Sitting quietly after a lunch  1  In a car, while stopped for traffic or a light  0  Total 7    Review of Systems   HENT: Positive for postnasal drip.    Eyes: Positive for itching. Negative for discharge.   Respiratory: Positive for apnea and cough.    Genitourinary: Negative for frequency and urgency.   Neurological: Positive for dizziness and numbness.   Psychiatric/Behavioral: Positive for agitation, decreased concentration and sleep disturbance.     Objective   Vital Signs:   /83   Pulse 72   Temp 97.6 °F (36.4 °C) (Temporal)   Resp 16   Ht 160 cm (63\")   Wt 108 kg (237 lb)   BMI 41.98 kg/m²     Physical Exam  Vitals reviewed.   Constitutional:       Appearance: Normal appearance.   Pulmonary:      Effort: Pulmonary effort is normal. No " respiratory distress.   Neurological:      General: No focal deficit present.      Mental Status: She is alert and oriented to person, place, and time.   Psychiatric:         Mood and Affect: Mood normal.         Behavior: Behavior normal.        Result Review :                 Assessment and Plan    Diagnoses and all orders for this visit:    1. Obstructive sleep apnea syndrome (Primary)    2. Insomnia, unspecified type    3. Migraine without aura and without status migrainosus, not intractable        The patient is compliant with and benefiting from PAP therapy.  Migraine improved with medication and treatment of alayna  Insomnia improved with cpap, for sleep maintainance insomnia suggest otc long acting melatonin  Pt aware of the recall on pap machines      Follow Up   Return in about 1 year (around 1/27/2023).    Patient was given instructions and counseling regarding her condition or for health maintenance advice. Please see specific information pulled into the AVS if appropriate.       This document has been electronically signed by Joseph Seipel, MD on January 27, 2022 10:56 EST

## 2022-01-27 ENCOUNTER — OFFICE VISIT (OUTPATIENT)
Dept: NEUROLOGY | Facility: CLINIC | Age: 62
End: 2022-01-27

## 2022-01-27 VITALS
HEART RATE: 72 BPM | BODY MASS INDEX: 41.99 KG/M2 | DIASTOLIC BLOOD PRESSURE: 83 MMHG | WEIGHT: 237 LBS | SYSTOLIC BLOOD PRESSURE: 129 MMHG | RESPIRATION RATE: 16 BRPM | HEIGHT: 63 IN | TEMPERATURE: 97.6 F

## 2022-01-27 DIAGNOSIS — G47.00 INSOMNIA, UNSPECIFIED TYPE: ICD-10-CM

## 2022-01-27 DIAGNOSIS — G47.33 OBSTRUCTIVE SLEEP APNEA SYNDROME: Primary | ICD-10-CM

## 2022-01-27 DIAGNOSIS — G43.009 MIGRAINE WITHOUT AURA AND WITHOUT STATUS MIGRAINOSUS, NOT INTRACTABLE: ICD-10-CM

## 2022-01-27 PROBLEM — G43.909 HEADACHE, MIGRAINE: Status: RESOLVED | Noted: 2020-01-23 | Resolved: 2022-01-27

## 2022-01-27 PROCEDURE — 99213 OFFICE O/P EST LOW 20 MIN: CPT | Performed by: PSYCHIATRY & NEUROLOGY

## 2022-01-27 RX ORDER — DICYCLOMINE HCL 20 MG
20 TABLET ORAL 2 TIMES DAILY PRN
COMMUNITY
Start: 2021-12-14

## 2022-01-27 RX ORDER — TOPIRAMATE 100 MG/1
150 TABLET, FILM COATED ORAL 2 TIMES DAILY
Start: 2022-01-27

## 2022-01-27 RX ORDER — FOLIC ACID 1 MG/1
TABLET ORAL
COMMUNITY
Start: 2022-01-07

## 2022-02-02 ENCOUNTER — TELEPHONE (OUTPATIENT)
Dept: NEUROLOGY | Facility: CLINIC | Age: 62
End: 2022-02-02

## 2022-02-02 DIAGNOSIS — G47.33 OBSTRUCTIVE SLEEP APNEA SYNDROME: Primary | ICD-10-CM

## 2022-02-02 NOTE — TELEPHONE ENCOUNTER
----- Message from Joseph F Seipel, MD sent at 1/27/2022 11:02 AM EST -----   nasal mask and goes through [facility]

## 2022-02-14 ENCOUNTER — HOSPITAL ENCOUNTER (OUTPATIENT)
Dept: ULTRASOUND IMAGING | Facility: HOSPITAL | Age: 62
Discharge: HOME | End: 2022-02-14

## 2022-02-14 ENCOUNTER — HOSPITAL ENCOUNTER (OUTPATIENT)
Dept: MAMMOGRAPHY | Facility: HOSPITAL | Age: 62
Discharge: HOME | End: 2022-02-14

## 2022-02-14 DIAGNOSIS — R92.8 ABNORMALITY OF LEFT BREAST ON SCREENING MAMMOGRAM: ICD-10-CM

## 2022-02-14 PROCEDURE — 77065 DX MAMMO INCL CAD UNI: CPT

## 2022-02-14 PROCEDURE — G0279 TOMOSYNTHESIS, MAMMO: HCPCS

## 2022-02-14 PROCEDURE — 76642 ULTRASOUND BREAST LIMITED: CPT

## 2022-11-22 ENCOUNTER — TRANSCRIBE ORDERS (OUTPATIENT)
Dept: ADMINISTRATIVE | Facility: HOSPITAL | Age: 62
End: 2022-11-22

## 2022-11-22 DIAGNOSIS — Z12.31 SCREENING MAMMOGRAM, ENCOUNTER FOR: Primary | ICD-10-CM

## 2022-11-22 DIAGNOSIS — M85.80 OTHER SPECIFIED DISORDERS OF BONE DENSITY AND STRUCTURE, UNSPECIFIED SITE: ICD-10-CM

## 2023-01-19 ENCOUNTER — OFFICE (AMBULATORY)
Dept: URBAN - METROPOLITAN AREA PATHOLOGY 4 | Facility: PATHOLOGY | Age: 63
End: 2023-01-19
Payer: OTHER GOVERNMENT

## 2023-01-19 ENCOUNTER — ON CAMPUS - OUTPATIENT (AMBULATORY)
Dept: URBAN - METROPOLITAN AREA HOSPITAL 2 | Facility: HOSPITAL | Age: 63
End: 2023-01-19

## 2023-01-19 VITALS
DIASTOLIC BLOOD PRESSURE: 63 MMHG | HEART RATE: 68 BPM | DIASTOLIC BLOOD PRESSURE: 56 MMHG | WEIGHT: 228 LBS | DIASTOLIC BLOOD PRESSURE: 75 MMHG | SYSTOLIC BLOOD PRESSURE: 108 MMHG | TEMPERATURE: 97 F | SYSTOLIC BLOOD PRESSURE: 137 MMHG | HEART RATE: 74 BPM | HEART RATE: 76 BPM | SYSTOLIC BLOOD PRESSURE: 96 MMHG | OXYGEN SATURATION: 100 % | HEIGHT: 63 IN | SYSTOLIC BLOOD PRESSURE: 100 MMHG | HEART RATE: 70 BPM | DIASTOLIC BLOOD PRESSURE: 59 MMHG | RESPIRATION RATE: 18 BRPM | OXYGEN SATURATION: 97 % | OXYGEN SATURATION: 95 % | DIASTOLIC BLOOD PRESSURE: 57 MMHG | SYSTOLIC BLOOD PRESSURE: 105 MMHG | SYSTOLIC BLOOD PRESSURE: 109 MMHG | HEART RATE: 77 BPM | RESPIRATION RATE: 16 BRPM | DIASTOLIC BLOOD PRESSURE: 77 MMHG | HEART RATE: 78 BPM

## 2023-01-19 DIAGNOSIS — Z12.11 ENCOUNTER FOR SCREENING FOR MALIGNANT NEOPLASM OF COLON: ICD-10-CM

## 2023-01-19 LAB
GI HISTOLOGY: A. SELECT: (no result)
GI HISTOLOGY: PDF REPORT: (no result)

## 2023-01-19 PROCEDURE — 45380 COLONOSCOPY AND BIOPSY: CPT | Mod: 33 | Performed by: INTERNAL MEDICINE

## 2023-01-19 PROCEDURE — 88305 TISSUE EXAM BY PATHOLOGIST: CPT | Performed by: INTERNAL MEDICINE

## 2023-01-19 RX ORDER — DICYCLOMINE HYDROCHLORIDE 20 MG/1
TABLET ORAL
Qty: 0 | Refills: 0 | Status: COMPLETED
End: 2023-01-19

## 2023-01-19 RX ORDER — GLYCOPYRROLATE 2 MG/1
2 TABLET ORAL
Qty: 90 | Refills: 1 | Status: ACTIVE
Start: 2023-01-19

## 2023-01-23 NOTE — PROGRESS NOTES
Chief Complaint  Sleep Apnea    Subjective          Eve MARVIN Skelton presents to [facility] NEUROLOGY  History of Present Illness    Yearly f/u for cpap compliance,paptient states she sleeps well pap machine and benefiting from therapy, patient uses a nasal mask and goes through [facility] for supplies.       SLEEP TESTING HISTORY:    New machine about 2017  On NPSG at [facility] , 4- patient had Moderate obstructive sleep apnea syndrome with apnea-hypopnea index of 17.0 per sleep hour, minimum SpO2 of 86%  PAP download:  The patient is on CPAP therapy at 10-20 cm/H2O.   Data indicates Excellent compliance. With 92% usage for more than 4 hours with an average usage of 8 hours 26 minutes. AHI down to 4.1 .  Average pressures 10.9.  Average large leak 9min.     The patient's hypersomnia has resolved       [place] Sleepiness Scale:  Sitting and reading 0 WatchingTV 0  Sitting, inactive, in a public place 0  As a passenger in a car for 1 hour w/o a break  0  Lying down to rest in the afternoon  3  Sitting and talking to someone  0  Sitting quietly after a lunch  0  In a car, while stopped for traffic or a light  0  Total 3    Takes melatonin to help with falling asleep   RLS controlled with gabapentin and topamax, fewer leg cramps    Review of Systems   Constitutional: Negative for activity change and appetite change.   HENT: Positive for hearing loss and postnasal drip.    Eyes: Negative.    Respiratory: Positive for apnea. Negative for choking.    Gastrointestinal: Positive for diarrhea. Negative for constipation.   Endocrine: Negative.    Genitourinary: Negative.    Musculoskeletal: Positive for back pain and joint swelling.   Neurological: Positive for dizziness, weakness and light-headedness.   Psychiatric/Behavioral: Positive for sleep disturbance. The patient is nervous/anxious.      Objective   Vital Signs:   /81 (BP Location: Right arm, Patient Position:  "Sitting, Cuff Size: Adult)   Pulse 79   Temp 98.2 °F (36.8 °C)   Ht 160 cm (63\")   Wt 105 kg (232 lb)   BMI 41.10 kg/m²     Physical Exam  Vitals reviewed.   HENT:      Nose: Nose normal.   Cardiovascular:      Pulses: Normal pulses.   Pulmonary:      Effort: Pulmonary effort is normal.   Neurological:      General: No focal deficit present.      Mental Status: She is alert and oriented to person, place, and time.   Psychiatric:         Mood and Affect: Mood normal.        Result Review :                 Assessment and Plan    Diagnoses and all orders for this visit:    1. Obstructive sleep apnea syndrome (Primary)    2. Insomnia, unspecified type    3. RLS (restless legs syndrome)      Continue cpap at current pressure    The patient is compliant with and benefiting from PAP therapy.      Follow Up   Return in about 1 year (around 1/26/2024).    Patient was given instructions and counseling regarding her condition or for health maintenance advice. Please see specific information pulled into the AVS if appropriate.       This document has been electronically signed by Joseph Seipel, MD on January 26, 2023 09:32 EST  "

## 2023-01-26 ENCOUNTER — OFFICE VISIT (OUTPATIENT)
Dept: NEUROLOGY | Facility: CLINIC | Age: 63
End: 2023-01-26
Payer: OTHER GOVERNMENT

## 2023-01-26 VITALS
HEIGHT: 63 IN | WEIGHT: 232 LBS | BODY MASS INDEX: 41.11 KG/M2 | SYSTOLIC BLOOD PRESSURE: 127 MMHG | HEART RATE: 79 BPM | DIASTOLIC BLOOD PRESSURE: 81 MMHG | TEMPERATURE: 98.2 F

## 2023-01-26 DIAGNOSIS — G47.00 INSOMNIA, UNSPECIFIED TYPE: ICD-10-CM

## 2023-01-26 DIAGNOSIS — G25.81 RLS (RESTLESS LEGS SYNDROME): ICD-10-CM

## 2023-01-26 DIAGNOSIS — G47.33 OBSTRUCTIVE SLEEP APNEA SYNDROME: Primary | ICD-10-CM

## 2023-01-26 PROCEDURE — 99213 OFFICE O/P EST LOW 20 MIN: CPT | Performed by: PSYCHIATRY & NEUROLOGY

## 2023-01-26 RX ORDER — SORBITOL SOLUTION 70 %
SOLUTION, ORAL MISCELLANEOUS SEE ADMIN INSTRUCTIONS
COMMUNITY
Start: 2023-01-07

## 2023-01-26 RX ORDER — TOBRAMYCIN AND DEXAMETHASONE 3; 1 MG/ML; MG/ML
SUSPENSION/ DROPS OPHTHALMIC
COMMUNITY
Start: 2022-11-23

## 2023-01-26 RX ORDER — VALACYCLOVIR HYDROCHLORIDE 500 MG/1
500 TABLET, FILM COATED ORAL DAILY PRN
COMMUNITY
Start: 2022-12-14

## 2023-01-26 NOTE — LETTER
January 26, 2023     Yvan Calderon MD  [address]  [place] [address]    Patient: Eve Skelton   YOB: 1960   Date of Visit: 1/26/2023       Dear Dr. Yumiko MD:    Thank you for referring Eve Skelton to me for evaluation. Below are the relevant portions of my assessment and plan of care.    If you have questions, please do not hesitate to call me. I look forward to following Eve along with you.         Sincerely,        Joseph F Seipel, MD        CC: No Recipients  Seipel, Joseph F, MD  01/26/23 0937  Signed  Chief Complaint  Sleep Apnea    Subjective           Eve Skelton presents to [facility] NEUROLOGY  History of Present Illness    Yearly f/u for cpap compliance,paptient states she sleeps well pap machine and benefiting from therapy, patient uses a nasal mask and goes through [employer] for supplies.       SLEEP TESTING HISTORY:    New machine about 2017  On NPSG at [facility] , 4- patient had Moderate obstructive sleep apnea syndrome with apnea-hypopnea index of 17.0 per sleep hour, minimum SpO2 of 86%  PAP download:  The patient is on CPAP therapy at 10-20 cm/H2O.   Data indicates Excellent compliance. With 92% usage for more than 4 hours with an average usage of 8 hours 26 minutes. AHI down to 4.1 .  Average pressures 10.9.  Average large leak 9min.     The patient's hypersomnia has resolved       [place] Sleepiness Scale:  Sitting and reading 0 WatchingTV 0  Sitting, inactive, in a public place 0  As a passenger in a car for 1 hour w/o a break  0  Lying down to rest in the afternoon  3  Sitting and talking to someone  0  Sitting quietly after a lunch  0  In a car, while stopped for traffic or a light  0  Total 3    Takes melatonin to help with falling asleep   RLS controlled with gabapentin and topamax, fewer leg cramps    Review of Systems   Constitutional: Negative for activity change and appetite change.   HENT: Positive  "for hearing loss and postnasal drip.    Eyes: Negative.    Respiratory: Positive for apnea. Negative for choking.    Gastrointestinal: Positive for diarrhea. Negative for constipation.   Endocrine: Negative.    Genitourinary: Negative.    Musculoskeletal: Positive for back pain and joint swelling.   Neurological: Positive for dizziness, weakness and light-headedness.   Psychiatric/Behavioral: Positive for sleep disturbance. The patient is nervous/anxious.      Objective    Vital Signs:   /81 (BP Location: Right arm, Patient Position: Sitting, Cuff Size: Adult)   Pulse 79   Temp 98.2 °F (36.8 °C)   Ht 160 cm (63\")   Wt 105 kg (232 lb)   BMI 41.10 kg/m²     Physical Exam  Vitals reviewed.   HENT:      Nose: Nose normal.   Cardiovascular:      Pulses: Normal pulses.   Pulmonary:      Effort: Pulmonary effort is normal.   Neurological:      General: No focal deficit present.      Mental Status: She is alert and oriented to person, place, and time.   Psychiatric:         Mood and Affect: Mood normal.        Result Review :                Assessment and Plan    Diagnoses and all orders for this visit:    1. Obstructive sleep apnea syndrome (Primary)    2. Insomnia, unspecified type    3. RLS (restless legs syndrome)      Continue cpap at current pressure    The patient is compliant with and benefiting from PAP therapy.      Follow Up   Return in about 1 year (around 1/26/2024).    Patient was given instructions and counseling regarding her condition or for health maintenance advice. Please see specific information pulled into the AVS if appropriate.       This document has been electronically signed by Joseph Seipel, MD on January 26, 2023 09:32 EST    "

## 2023-04-17 ENCOUNTER — HOSPITAL ENCOUNTER (OUTPATIENT)
Dept: BONE DENSITY | Facility: HOSPITAL | Age: 63
Discharge: HOME | End: 2023-04-17
Payer: OTHER GOVERNMENT

## 2023-04-17 ENCOUNTER — HOSPITAL ENCOUNTER (OUTPATIENT)
Dept: MAMMOGRAPHY | Facility: HOSPITAL | Age: 63
Discharge: HOME | End: 2023-04-17
Payer: OTHER GOVERNMENT

## 2023-04-17 DIAGNOSIS — M85.80 OTHER SPECIFIED DISORDERS OF BONE DENSITY AND STRUCTURE, UNSPECIFIED SITE: ICD-10-CM

## 2023-04-17 DIAGNOSIS — Z12.31 SCREENING MAMMOGRAM, ENCOUNTER FOR: ICD-10-CM

## 2023-04-17 PROCEDURE — 77080 DXA BONE DENSITY AXIAL: CPT

## 2023-04-17 PROCEDURE — 77067 SCR MAMMO BI INCL CAD: CPT

## 2023-04-17 PROCEDURE — 77063 BREAST TOMOSYNTHESIS BI: CPT

## 2023-12-15 ENCOUNTER — TRANSCRIBE ORDERS (OUTPATIENT)
Dept: ADMINISTRATIVE | Facility: HOSPITAL | Age: 63
End: 2023-12-15
Payer: OTHER GOVERNMENT

## 2023-12-15 DIAGNOSIS — Z12.31 ENCOUNTER FOR SCREENING MAMMOGRAM FOR MALIGNANT NEOPLASM OF BREAST: Primary | ICD-10-CM

## 2024-01-26 NOTE — PROGRESS NOTES
"Chief Complaint  Follow-up (MODESTA AND RLS)    Subjective          Eve Skelton presents to [facility] NEUROLOGY  History of Present Illness  Yearly f/u for cpap compliance,patient states she sleeps well and benefiting from therapy, patient uses a nasal mask and goes through [employer] for supplies.       RLS - patient states she no longer has issues w/ rls she no longer takesgabapentin off x's 2 months and topamax off x's 4-5 months.     SLEEP TESTING HISTORY:    On NPSG at [facility] [place] , 4- patient had Moderate obstructive sleep apnea syndrome with apnea-hypopnea index of 17.0 per sleep hour, minimum SpO2 of 86%    PAP download:  The patient is on CPAP therapy at 10-20 cm/H2O.   Data indicates Excellent compliance. With 100% usage for more than 4 hours with an average usage of 9 hours 4 minutes. AHI down to 2.8 .  Average pressures 10.6.  Average large leak 6sec.     The patient's hypersomnia has resolved       [place] Sleepiness Scale:  Sitting and reading 1 WatchingTV 0  Sitting, inactive, in a public place 1  As a passenger in a car for 1 hour w/o a break  0  Lying down to rest in the afternoon  2  Sitting and talking to someone  0  Sitting quietly after a lunch  1  In a car, while stopped for traffic or a light  0  Total 5    Review of Systems   Constitutional:  Negative for fatigue.   Respiratory:  Negative for shortness of breath.    Psychiatric/Behavioral:  Negative for sleep disturbance.        Objective   Vital Signs:   /92   Pulse 88   Resp 18   Ht 160 cm (63\")   Wt 95.3 kg (210 lb)   BMI 37.20 kg/m²     Physical Exam  Vitals reviewed.   Cardiovascular:      Rate and Rhythm: Normal rate.      Pulses: Normal pulses.   Pulmonary:      Effort: Pulmonary effort is normal.   Neurological:      General: No focal deficit present.      Mental Status: She is alert.   Psychiatric:         Mood and Affect: Mood normal.      Result Review :               "   Assessment and Plan    Diagnoses and all orders for this visit:    1. Obstructive sleep apnea syndrome (Primary)    2. Insomnia, unspecified type    3. RLS (restless legs syndrome)      Continue cpap 10-20 with nasal mask  No medication needed for rls or insomnia at this time  The patient is compliant with and benefiting from PAP therapy.      Follow Up   Return in about 1 year (around 1/29/2025).    Patient was given instructions and counseling regarding her condition or for health maintenance advice. Please see specific information pulled into the AVS if appropriate.       This document has been electronically signed by Joseph Seipel, MD on January 29, 2024 10:15 EST

## 2024-01-29 ENCOUNTER — OFFICE VISIT (OUTPATIENT)
Dept: NEUROLOGY | Facility: CLINIC | Age: 64
End: 2024-01-29
Payer: OTHER GOVERNMENT

## 2024-01-29 VITALS
SYSTOLIC BLOOD PRESSURE: 153 MMHG | HEART RATE: 88 BPM | DIASTOLIC BLOOD PRESSURE: 92 MMHG | HEIGHT: 63 IN | WEIGHT: 210 LBS | RESPIRATION RATE: 18 BRPM | BODY MASS INDEX: 37.21 KG/M2

## 2024-01-29 DIAGNOSIS — G25.81 RLS (RESTLESS LEGS SYNDROME): ICD-10-CM

## 2024-01-29 DIAGNOSIS — G47.33 OBSTRUCTIVE SLEEP APNEA SYNDROME: Primary | ICD-10-CM

## 2024-01-29 DIAGNOSIS — G47.00 INSOMNIA, UNSPECIFIED TYPE: ICD-10-CM

## 2024-01-29 PROCEDURE — 99213 OFFICE O/P EST LOW 20 MIN: CPT | Performed by: PSYCHIATRY & NEUROLOGY

## 2024-01-29 RX ORDER — GLYCOPYRROLATE 2 MG/1
TABLET ORAL
COMMUNITY
Start: 2024-01-24

## 2024-01-29 RX ORDER — BLOOD-GLUCOSE METER
KIT MISCELLANEOUS
COMMUNITY
Start: 2023-12-20

## 2024-01-29 RX ORDER — BACLOFEN 10 MG/1
TABLET ORAL
COMMUNITY
Start: 2023-11-07

## 2024-04-18 ENCOUNTER — HOSPITAL ENCOUNTER (OUTPATIENT)
Dept: MAMMOGRAPHY | Facility: HOSPITAL | Age: 64
Discharge: HOME | End: 2024-04-18
Admitting: OBSTETRICS & GYNECOLOGY
Payer: OTHER GOVERNMENT

## 2024-04-18 DIAGNOSIS — Z12.31 ENCOUNTER FOR SCREENING MAMMOGRAM FOR MALIGNANT NEOPLASM OF BREAST: ICD-10-CM

## 2024-04-18 PROCEDURE — 77067 SCR MAMMO BI INCL CAD: CPT

## 2024-04-18 PROCEDURE — 77063 BREAST TOMOSYNTHESIS BI: CPT

## 2024-04-23 ENCOUNTER — TELEPHONE (OUTPATIENT)
Dept: BARIATRICS/WEIGHT MGMT | Facility: CLINIC | Age: 64
End: 2024-04-23
Payer: OTHER GOVERNMENT

## 2024-07-17 ENCOUNTER — OFFICE VISIT (OUTPATIENT)
Dept: PSYCHIATRY | Facility: CLINIC | Age: 64
End: 2024-07-17
Payer: OTHER GOVERNMENT

## 2024-07-17 VITALS — BODY MASS INDEX: 33.69 KG/M2 | WEIGHT: 190.2 LBS

## 2024-07-17 DIAGNOSIS — F51.05 INSOMNIA DUE TO MENTAL CONDITION: Chronic | ICD-10-CM

## 2024-07-17 DIAGNOSIS — Z79.899 ENCOUNTER FOR LONG-TERM (CURRENT) USE OF OTHER MEDICATIONS: ICD-10-CM

## 2024-07-17 DIAGNOSIS — F31.81 BIPOLAR II DISORDER: Primary | Chronic | ICD-10-CM

## 2024-07-17 DIAGNOSIS — F41.1 GENERALIZED ANXIETY DISORDER: Chronic | ICD-10-CM

## 2024-07-17 RX ORDER — HYDROCODONE BITARTRATE AND ACETAMINOPHEN 7.5; 325 MG/1; MG/1
1 TABLET ORAL EVERY 6 HOURS PRN
COMMUNITY

## 2024-07-17 RX ORDER — CARISOPRODOL 250 MG/1
1 TABLET ORAL 3 TIMES DAILY PRN
COMMUNITY

## 2024-07-17 RX ORDER — BUPROPION HYDROCHLORIDE 150 MG/1
150 TABLET ORAL EVERY MORNING
Qty: 90 TABLET | Refills: 1 | Status: SHIPPED | OUTPATIENT
Start: 2024-07-17

## 2024-07-17 RX ORDER — TRAZODONE HYDROCHLORIDE 50 MG/1
TABLET ORAL
Qty: 60 TABLET | Refills: 1 | Status: SHIPPED | OUTPATIENT
Start: 2024-07-17

## 2024-07-17 RX ORDER — ARIPIPRAZOLE 5 MG/1
1 TABLET ORAL DAILY
COMMUNITY
Start: 2024-07-11 | End: 2024-07-17

## 2024-07-17 RX ORDER — ARIPIPRAZOLE 5 MG/1
5 TABLET ORAL DAILY
COMMUNITY
End: 2024-07-17

## 2024-07-17 RX ORDER — VILAZODONE HYDROCHLORIDE 20 MG/1
20 TABLET ORAL DAILY
Qty: 90 TABLET | Refills: 1 | Status: SHIPPED | OUTPATIENT
Start: 2024-07-17

## 2024-07-17 NOTE — PATIENT INSTRUCTIONS
Stop abilify.   Start trazodone at night. Take 1 tablet. If still awake in 1 hour, may take second tablet.   Continue vilazodone, but decrease to 20mg.   Continue Wellbutrin, but drop to 150mg.   Start Vraylar 1.5mg. Start every other day for 1-2 weeks, then increase to daily. Take in the morning.   Continue lorazepam as needed.

## 2024-07-17 NOTE — PROGRESS NOTES
[facility]   [address], [address]  [place] [address]  [phone]  Susan Hamilton, MSN, APRN, PMHNP-BC    NAME: Eve Skelton     : 1960   MRN: [id number]     Patient Care Team:  Yvan Calderon MD as PCP - General    DATE: 2024    Subjective     CHIEF COMPLAINT: depression and anxiety     HPI:  Eve Skelton, a 64 y.o. female patient seen for the first time today for initial evaluation.    She is on Bupropion 300mg, abilify 5, vilazoodone 40mg, lorazepam   Started ablify 5mg, lorazepam 1mg daily PRN.     Symptoms: States she is depressed a lot of the time, little interest or pleasure in doing things, difficulty sleeping.     States her  contributes to her depression. They have been together 40 years. He was in the [employer]. He is retired now. She says just his tone of voice can set her off.     She has been off work since .   Patient was seeing Dr. Mckinney for 4-5 years prior to her retiring. She saw Dr. Bell before that.     Medications:   Prozac--she feels like this worked. She is unsure why they changed it. Around .   Amitriptyline--this worked for sleep but made her tired.   Lorazepam 1mg--this works if she takes a whole tablet.     Family psych disorder:   Son-anxiety, depression  Daughter--bipolar  Dad--undiagnosed depression, and alcoholism     Adriana Lozano-counselor. She sees her once a month. She has been with her about 5 years.     She was at [facility] in : She had an episode where she couldn't stop crying.     Denies any suicidal thoughts recently.     She states her daughter was recently diagnosed with bipolar disorder, and she feels like she has a lot of the same symptoms. She completed a MDQ which was positive for bipolar disorder.     Mood Disorder Questionnaire:   1. Has there ever been a period of time when you were not your self and   You felt so good or so hyper that other people thought you were not your normal  self or you were so hyper that you got into trouble ?: YES  You were so irritable that you shouted at people or started fights or arguments?: YES  You felt more self-confident than usual?: YES  You got much less sleep than usual and found that you didn't really miss it?: NO  You were more tallkative or spoke much faster than usual? : YES  Thoughts raced through your head or you couldn't slow your mind down?: YES  You were so easily distracted by things around you that you had trouble concentrating or staying on track: YES  You had more energy than usual: YES  You were much more active than usual: YES  You were much more social or outgoing than usual, for example, you telephoned friends in the middle of the night?: NO  You were much more interested in sex than usual: YES  You did things that were unusual for you, or that other people might have thought were excessive, foolish, or risky ?: YES  Spending money got you or your family in trouble ?: YES  MDQ Questionnaire Section 1 Total: 11       SYMPTOMS:      MOOD: down/low      SLEEP: average      ENERGY: poor     CONCENTRATION/FOCUS: poor     APPETITE: poor    PRIOR PSYCH MEDICATIONS:  Ambien--she got up and did things int he middle of the night.   Prozac--she feels like this worked. She is unsure why they changed it. Around 1990.   Amitriptyline--this worked for sleep but made her tired.   Lorazepam 1mg--this works if she takes a whole tablet.     PRIOR PSYCH DX:   Depression   Anxiety     PRIOR MENTAL HEALTH PROVIDERS:  Dr. Hank Bell    PSYCH ADMISSIONS:   One admission in 2009 at [facility]    SELF HARM/SUICIDALLY:   Denies     Patient presents with symptoms and behaviors that are consistent with the following DSM-5 diagnoses:  Bipolar II disorder   2. Generalized anxiety disorder   3. Insomnia due to mental condition     Ability and capacity to respond to treatment: good  Functional status: fair  Prognosis: good  Long term goals: improve depression and  overall quality of life.  and improve anxiety and overall quality of life.   Short term goals:improve sleep. , reduce anxiety. , and improve depression.     Objective     Wt 86.3 kg (190 lb 3.2 oz)   BMI 33.69 kg/m²   No LMP recorded. Patient has had a hysterectomy.    Social History     Occupational History    Not on file   Tobacco Use    Smoking status: Every Day     Current packs/day: 0.50     Types: Cigarettes    Smokeless tobacco: Never   Substance and Sexual Activity    Alcohol use: Yes     Alcohol/week: 1.0 standard drink of alcohol     Types: 1 Glasses of wine per week    Drug use: Never    Sexual activity: Defer     Family History   Problem Relation Age of Onset    No Known Problems Mother     Breast cancer Father     Breast cancer Sister     Breast cancer Brother     No Known Problems Daughter     No Known Problems Son     No Known Problems Maternal Grandmother     No Known Problems Paternal Grandmother     No Known Problems Maternal Aunt     No Known Problems Paternal Aunt       Past Medical History:   Diagnosis Date    Anxiety     Arthritis     Breast cancer     Diabetes     Elevated cholesterol     GERD (gastroesophageal reflux disease)     Headache     History of transfusion     HTN (hypertension)     RLS (restless legs syndrome)     Sleep apnea      Past Surgical History:   Procedure Laterality Date    BREAST BIOPSY Right     BREAST EXCISIONAL BIOPSY Left     ORIF HUMERUS FRACTURE Left 12/3/2020    Procedure: HUMERUS PROXIMAL OPEN REDUCTION INTERNAL FIXATION;  Surgeon: Yvan Echols MD;  Location: [facility];  Service: Orthopedics;  Laterality: Left;      Review of Systems     The following portions of the patient's history were reviewed and updated as appropriate: allergies, current medications, past family history, past medical history, past social history, past surgical history and problem list.      Allergy:   Allergies   Allergen Reactions    Vancomycin Other (See Comments)     Ringing of  ears         Discontinued Medications:  Medications Discontinued During This Encounter   Medication Reason    ARIPiprazole (ABILIFY) 5 MG tablet     vilazodone (VIIBRYD) 40 MG tablet tablet Reorder    buPROPion XL (WELLBUTRIN XL) 300 MG 24 hr tablet Reorder    ARIPiprazole (ABILIFY) 5 MG tablet        Current Medications:   Current Outpatient Medications   Medication Sig Dispense Refill    aspirin 81 MG tablet Take 1 tablet by mouth Daily.      baclofen (LIORESAL) 10 MG tablet       buPROPion XL (WELLBUTRIN XL) 150 MG 24 hr tablet Take 1 tablet by mouth Every Morning. (Patient taking differently: Take 2 tablets by mouth Every Morning.) 90 tablet 1    Ca Phosphate-Cholecalciferol (Calcium 500 + D3) 250-500 MG-UNIT chewable tablet Chew 1 tablet Daily.      Cariprazine HCl (Vraylar) 1.5 MG capsule capsule Take 1 capsule by mouth Every Morning. 28 capsule 0    carisoprodol (SOMA) 250 MG tablet Take 1 tablet by mouth 3 (Three) Times a Day As Needed for Muscle Spasms.      Cholecalciferol 25 MCG (1000 UT) tablet Take 2 tablets by mouth Daily.      folic acid (FOLVITE) 1 MG tablet       FREESTYLE LITE test strip       glycopyrrolate (ROBINUL) 2 MG tablet       hydroCHLOROthiazide (HYDRODIURIL) 25 MG tablet Take 1 tablet by mouth Daily.      HYDROcodone-acetaminophen (NORCO) 7.5-325 MG per tablet Take 1 tablet by mouth Every 6 (Six) Hours As Needed for Moderate Pain.      L-Methylfolate-Algae 15-90.314 MG capsule Take 1 capsule by mouth Daily.      LORazepam (ATIVAN) 1 MG tablet Take 1 tablet by mouth Daily As Needed for Anxiety.      meloxicam (MOBIC) 15 MG tablet Take 1 tablet by mouth Daily.      metFORMIN ER (GLUCOPHAGE-XR) 500 MG 24 hr tablet Take 1 tablet by mouth.      Omega-3 Fatty Acids (FISH OIL) 1000 MG capsule capsule Take 1 capsule by mouth 2 (Two) Times a Day With Meals.      omeprazole (priLOSEC) 20 MG capsule Take 1 capsule by mouth Daily.      polyethyl glycol-propyl glycol (SYSTANE) 0.4-0.3 % solution  ophthalmic solution Administer 2 drops to both eyes Every 1 (One) Hour As Needed.      pravastatin (PRAVACHOL) 40 MG tablet Take 1 tablet by mouth Every Night.      sorbitol 70 % solution solution See Admin Instructions.      tobramycin-dexamethasone (TOBRADEX) 0.3-0.1 % ophthalmic suspension       traZODone (DESYREL) 50 MG tablet Take 1 tablet nightly. If still awake in 1 hour, may take second tablet. 60 tablet 1    Turmeric 500 MG tablet Take 1 tablet by mouth Daily.      valACYclovir (VALTREX) 500 MG tablet Take 1 tablet by mouth Daily As Needed.      valsartan (DIOVAN) 320 MG tablet Take 1 tablet by mouth Daily.      vilazodone (VIIBRYD) 20 MG tablet tablet Take 1 tablet by mouth Daily. (Patient taking differently: Take 2 tablets by mouth Daily.) 90 tablet 1     No current facility-administered medications for this visit.     MENTAL STATUS EXAM   General Appearance:  Cleanly groomed and dressed  Eye Contact:  Good eye contact  Attitude:  Cooperative  Motor Activity:  Normal gait, posture  Muscle Strength:  Normal  Speech:  Normal rate, tone, volume  Language:  Spontaneous  Mood and affect:  Anxious and depressed  Hopelessness:  Denies  Loneliness: Denies  Thought Process:  Logical and goal-directed  Associations/ Thought Content:  No delusions  Hallucinations:  None  Suicidal Ideations:  Not present  Homicidal Ideation:  Not present  Sensorium:  Alert  Orientation:  Person, place, time and situation  Immediate Recall, Recent, and Remote Memory:  Intact  Attention Span/ Concentration:  Good  Fund of Knowledge:  Appropriate for age and educational level  Intellectual Functioning:  Average range  Insight:  Fair  Judgement:  Fair  Reliability:  Fair  Impulse Control:  Fair     PHQ-9 Depression Screening    Little interest or pleasure in doing things? 3-->nearly every day   Feeling down, depressed, or hopeless? 2-->more than half the days   Trouble falling or staying asleep, or sleeping too much? 3-->nearly every day    Feeling tired or having little energy? 3-->nearly every day   Poor appetite or overeating? 3-->nearly every day   Feeling bad about yourself - or that you are a failure or have let yourself or your family down? 3-->nearly every day   Trouble concentrating on things, such as reading the newspaper or watching television? 2-->more than half the days   Moving or speaking so slowly that other people could have noticed? Or the opposite - being so fidgety or restless that you have been moving around a lot more than usual? 1-->several days   Thoughts that you would be better off dead, or of hurting yourself in some way? 1-->several days   PHQ-9 Total Score 21   If you checked off any problems, how difficult have these problems made it for you to do your work, take care of things at home, or get along with other people? very difficult        GAD7 Documentation:  Feeling nervous, anxious or on edge 0   Not being able to stop or control worrying 3   Worrying too much about different things 3   Trouble relaxing 3   Being so restless that it is hard to sit still 1   Becoming easily annoyed or irritable 1   Feeling Afraid as if something awful might happen 2   MORE Total Score 13   How difficult have these problems made it for you? Extremely difficult     Current every day smoker less than 3 minutes spent counseling Not agreeable to stopping    I advised Eve of the risks of tobacco use.     Result Review:    Labs:  No visits with results within 3 Month(s) from this visit.   Latest known visit with results is:   Hospital Outpatient Visit on 06/08/2021   Component Date Value Ref Range Status    QT Interval 06/08/2021 358  ms Final       Assessment & Plan   Diagnoses and all orders for this visit:    1. Bipolar II disorder (Primary)  -     vilazodone (VIIBRYD) 20 MG tablet tablet; Take 1 tablet by mouth Daily. (Patient taking differently: Take 2 tablets by mouth Daily.)  Dispense: 90 tablet; Refill: 1  -     buPROPion XL  (WELLBUTRIN XL) 150 MG 24 hr tablet; Take 1 tablet by mouth Every Morning. (Patient taking differently: Take 2 tablets by mouth Every Morning.)  Dispense: 90 tablet; Refill: 1  -     Cariprazine HCl (Vraylar) 1.5 MG capsule capsule; Take 1 capsule by mouth Every Morning.  Dispense: 28 capsule; Refill: 0  -     ToxAssure Flex 22, Ur w/DL -    2. Insomnia due to mental condition  -     traZODone (DESYREL) 50 MG tablet; Take 1 tablet nightly. If still awake in 1 hour, may take second tablet.  Dispense: 60 tablet; Refill: 1    3. Encounter for long-term (current) use of other medications  -     ToxAssure Flex 22, Ur w/DL -    4. Generalized anxiety disorder  -     vilazodone (VIIBRYD) 20 MG tablet tablet; Take 1 tablet by mouth Daily. (Patient taking differently: Take 2 tablets by mouth Daily.)  Dispense: 90 tablet; Refill: 1  -     buPROPion XL (WELLBUTRIN XL) 150 MG 24 hr tablet; Take 1 tablet by mouth Every Morning. (Patient taking differently: Take 2 tablets by mouth Every Morning.)  Dispense: 90 tablet; Refill: 1       Stop abilify.   Start trazodone at night. Take 1 tablet. If still awake in 1 hour, may take second tablet.   Continue vilazodone, but decrease to 20mg.   Continue Wellbutrin, but decrease to 150mg.   Start Vraylar 1.5mg. Start every other day for 1-2 weeks, then increase to daily. Take in the morning.   Continue lorazepam as needed.  Advised we would try to decrease use of this medication over time. She was primarily using it for sleep. Hopefully the trazodone will help with sleep and she wont need as much.     Visit Diagnoses:    ICD-10-CM ICD-9-CM   1. Bipolar II disorder  F31.81 296.89   2. Insomnia due to mental condition  F51.05 300.9     327.02   3. Encounter for long-term (current) use of other medications  Z79.899 V58.69   4. Generalized anxiety disorder  F41.1 300.02     Pt history, review of systems, medications, allergies, reviewed, patient was screened today for depression/anxiety,  PHQ/MORE scores reviewed.  Most recent vitals/labs reviewed.  Pt was given appropriate time to ask questions and concerns were addressed. A thorough discussion was had that included review of disease process, need for continued monitoring and additional treatment options including use of pharmacological and non-pharmacological approaches to care, decisions were made and agreed upon by patient and provider.   Discussed the risks, benefits, and potential side effects of the medications; patient ackowledged and verbally consented.     TREATMENT PLAN/GOALS: Continue supportive psychotherapy efforts and medications as indicated. Treatment and medication options discussed during today's visit. Patient ackowledged and verbally consented to continue with current treatment plan and was educated on the importance of compliance with treatment and follow-up appointments.    -Short-Term Goals: Patient will be compliant with medication management and note improvement in S/S over the next 4 to 6 weeks or at next scheduled visit.  -Long-Term Goals: Patient will be compliant with the agreed treatment plan including medication regimen & F/U appt's and deny impairment in daily functioning over the next 6 months.      CRISIS RESOURCES:    In the event you have personal crisis, there are several resources to reach someone to talk with:    988 Suicide and Crisis Lifeline  Call or text 988 or chat 988"[employer]line.org  [facility]'s National Helpline  0-657-210-HELP (4357)  Text your zip code to [address] (HELP4U)  's Crisis Line  Dial 838, then press 1  Text 168427    No show policy:  We understand unexpected circumstances arise; however, anytime you miss your appointment we are unable to provide you appropriate care.  In addition, each appointment missed could have been used to provide care for others.  We ask that you call at least 24 hours in advance to cancel or reschedule an appointment. We would like to take this opportunity to remind you  of our policy stating patients who miss THREE appointments without cancelling or rescheduling 24 hours in advance of the appointment may be subject to cancellation of any further visits with our clinic. Please call [phone] to reschedule your appointment. If there are reasons that make it difficult for you to keep the appointments, please call and let us know how we can help. Please understand that medication prescribing will not continue without seeing your provider.        MEDICATION ISSUES:  INSPECT reviewed as expected    Discussed medication options and treatment plan of prescribed medication as well as the risks, benefits, and side effects including potential falls, possible impaired driving and metabolic adversities among others. Patient is agreeable to call the office with any worsening of symptoms or onset of side effects. Patient is agreeable to call 911 or go to the nearest ER should he/she begin having SI/HI. No medication side effects or related complaints today.     MEDS ORDERED DURING VISIT:  New Medications Ordered This Visit   Medications    traZODone (DESYREL) 50 MG tablet     Sig: Take 1 tablet nightly. If still awake in 1 hour, may take second tablet.     Dispense:  60 tablet     Refill:  1    vilazodone (VIIBRYD) 20 MG tablet tablet     Sig: Take 1 tablet by mouth Daily.     Dispense:  90 tablet     Refill:  1    buPROPion XL (WELLBUTRIN XL) 150 MG 24 hr tablet     Sig: Take 1 tablet by mouth Every Morning.     Dispense:  90 tablet     Refill:  1    Cariprazine HCl (Vraylar) 1.5 MG capsule capsule     Sig: Take 1 capsule by mouth Every Morning.     Dispense:  28 capsule     Refill:  0     Order Specific Question:   Lot Number?     Answer:   [id number]     Order Specific Question:   Expiration Date?     Answer:   5/31/2026     Order Specific Question:   Quantity     Answer:   28       Return in about 6 weeks (around 8/28/2024).         This document has been electronically signed by Susan PONCE  BRENNEN Hamilton  July 17, 2024 20:09 EDT    Part of this note may be an electronic transcription/translation of spoken language to printed text using the Dragon Dictation System. Some of the data in this electronic note has been brought forward from a previous encounter, any necessary changes have been made, it has been reviewed by this APRN, and it is accurate.

## 2024-07-23 LAB
1OH-MIDAZOLAM UR QL SCN: NOT DETECTED NG/MG CREAT
6MAM UR QL SCN: NEGATIVE NG/ML
7AMINOCLONAZEPAM/CREAT UR: NOT DETECTED NG/MG CREAT
8OH-AMOXAPINE UR QL: NOT DETECTED
8OH-LOXAPINE UR QL SCN: NOT DETECTED
A-OH ALPRAZ/CREAT UR: NOT DETECTED NG/MG CREAT
A-OH-TRIAZOLAM/CREAT UR CFM: NOT DETECTED NG/MG CREAT
ALPRAZ/CREAT UR CFM: NOT DETECTED NG/MG CREAT
AMITRIP UR-MCNC: NOT DETECTED NG/ML
AMOXAPINE UR QL: NOT DETECTED
AMPHETAMINES UR QL SCN: NEGATIVE NG/ML
ANTICONVULSANTS UR: NEGATIVE
ANTIPSYCHOTICS UR: NEGATIVE
ARIPIPRAZOLE UR QL SCN: NOT DETECTED
ASENAPINE UR QL CFM: NOT DETECTED
BARBITURATES UR QL SCN: NEGATIVE NG/ML
BENZODIAZ SCN METH UR: NORMAL
BUPRENORPHINE UR QL SCN: NEGATIVE NG/ML
BUPROPION UR QL: PRESENT
CANNABINOIDS UR QL SCN: NEGATIVE NG/ML
CARISOPRODOL UR QL: NEGATIVE NG/ML
CHLORPROMAZINE UR QL: NOT DETECTED
CITALOPRAM, UR: NOT DETECTED
CLOMIPRAMINE UR-MCNC: NOT DETECTED NG/ML
CLONAZEPAM/CREAT UR CFM: NOT DETECTED NG/MG CREAT
CLOZAPINE UR QL: NOT DETECTED
COCAINE+BZE UR QL SCN: NEGATIVE NG/ML
CREAT UR-MCNC: 20 MG/DL
DESALKYLFLURAZ/CREAT UR: NOT DETECTED NG/MG CREAT
DESIPRAMINE UR-MCNC: NOT DETECTED NG/ML
DIAZEPAM/CREAT UR: NOT DETECTED NG/MG CREAT
DOXEPIN UR-MCNC: NOT DETECTED NG/ML
DULOXETINE UR QL: NOT DETECTED
ETHANOL UR QL SCN: NEGATIVE NG/ML
FENTANYL UR QL SCN: NEGATIVE NG/ML
FLUNITRAZEPAM UR QL SCN: NOT DETECTED NG/MG CREAT
FLUOXETINE UR QL SCN: NOT DETECTED
FLUPHENAZINE UR-MCNC: NOT DETECTED NG/ML
FLUVOXAMINE UR QL: NOT DETECTED
GABAPENTIN UR-MCNC: NEGATIVE UG/ML
HALOPERIDOL UR QL: NOT DETECTED
ILOPERIDONE UR QL CFM: NOT DETECTED
IMIPRAMINE UR-MCNC: NOT DETECTED NG/ML
KRATOM IA, UR: NEGATIVE NG/ML
LORAZEPAM/CREAT UR: 320 NG/MG CREAT
LOXAPINE UR QL: NOT DETECTED
LURASIDONE UR QL CFM: NOT DETECTED
MAPROTILINE UR QL: NOT DETECTED
ME-PHENIDATE UR QL SCN: NEGATIVE NG/ML
MESORIDAZINE UR QL: NOT DETECTED
METHADONE UR QL SCN: NEGATIVE NG/ML
METHADONE+METAB UR QL SCN: NEGATIVE NG/ML
MIDAZOLAM/CREAT UR CFM: NOT DETECTED NG/MG CREAT
MIRTAZAPINE UR-MCNC: NOT DETECTED UG/ML
MOLINDONE UR QL SCN: NOT DETECTED
NEFAZODONE UR QL: NOT DETECTED
NORCITALOPRAM UR QL: NOT DETECTED
NORCLOMIPRAMINE UR QL: NOT DETECTED
NORCLOZAPINE UR QL: NOT DETECTED
NORDIAZEPAM/CREAT UR: NOT DETECTED NG/MG CREAT
NORDOXEPIN UR QL: NOT DETECTED
NORFLUNITRAZEPAM UR-MCNC: NOT DETECTED NG/MG CREAT
NORFLUOXETINE UR-MCNC: NOT DETECTED NG/ML
NORSERTRALINE UR QL: NOT DETECTED
NORTRIP UR-MCNC: NOT DETECTED NG/ML
ODV UR-MCNC: NOT DETECTED NG/ML
OH-BUPROPION UR-MCNC: PRESENT NG/ML
OLANZAPINE UR CFM-MCNC: NOT DETECTED NG/ML
OPIATES UR SCN-MCNC: NEGATIVE NG/ML
OXAZEPAM/CREAT UR: NOT DETECTED NG/MG CREAT
OXYCODONE CTO UR SCN-MCNC: NEGATIVE NG/ML
PAROXETINE UR-MCNC: NOT DETECTED NG/L
PCP UR QL SCN: NEGATIVE NG/ML
PERPHENAZINE UR QL: NOT DETECTED
PIMOZIDE, UR: NOT DETECTED
PREGABALIN UR QL SCN: NOT DETECTED
PRESCRIBED MEDICATIONS: NORMAL
PROCHLORPERAZINE UR QL: NOT DETECTED
PROPOXYPH UR QL SCN: NEGATIVE NG/ML
PROTRIP UR QL: NOT DETECTED
QUETIAPINE CTO UR CFM-MCNC: NOT DETECTED NG/ML
RISPERIDONE UR QL: NOT DETECTED
SERTRALINE UR-MCNC: NOT DETECTED NG/ML
TAPENTADOL CTO UR SCN-MCNC: NEGATIVE NG/ML
TEMAZEPAM/CREAT UR: NOT DETECTED NG/MG CREAT
THIORIDAZINE UR-MCNC: NOT DETECTED UG/ML
THIOTHIXENE UR QL: NOT DETECTED
TRAMADOL UR QL SCN: NEGATIVE NG/ML
TRAZODONE UR QL: NOT DETECTED
TRAZODONE UR-MCNC: NOT DETECTED UG/ML
TRICYCLICS TESTED UR SCN: NORMAL
TRIFPERAZINE UR QL: NOT DETECTED
TRIMIPRAMINE UR QL: NOT DETECTED
VENLAFAXINE UR QL: NOT DETECTED
VILAZ UR QL SCN: PRESENT
ZIPRASIDONE UR QL SCN: NOT DETECTED

## 2024-08-08 DIAGNOSIS — F31.81 BIPOLAR II DISORDER: Chronic | ICD-10-CM

## 2024-09-18 ENCOUNTER — OFFICE VISIT (OUTPATIENT)
Dept: PSYCHIATRY | Facility: CLINIC | Age: 64
End: 2024-09-18
Payer: OTHER GOVERNMENT

## 2024-09-18 DIAGNOSIS — F51.05 INSOMNIA DUE TO MENTAL CONDITION: Chronic | ICD-10-CM

## 2024-09-18 DIAGNOSIS — F31.81 BIPOLAR II DISORDER: Primary | Chronic | ICD-10-CM

## 2024-09-18 DIAGNOSIS — F41.1 GENERALIZED ANXIETY DISORDER: Chronic | ICD-10-CM

## 2024-09-18 RX ORDER — VILAZODONE HYDROCHLORIDE 20 MG/1
20 TABLET ORAL DAILY
Qty: 90 TABLET | Refills: 1 | Status: SHIPPED | OUTPATIENT
Start: 2024-09-18

## 2024-10-15 DIAGNOSIS — F51.05 INSOMNIA DUE TO MENTAL CONDITION: Chronic | ICD-10-CM

## 2024-10-15 RX ORDER — TRAZODONE HYDROCHLORIDE 50 MG/1
TABLET, FILM COATED ORAL
Qty: 60 TABLET | Refills: 1 | Status: SHIPPED | OUTPATIENT
Start: 2024-10-15

## 2024-12-14 NOTE — PROGRESS NOTES
[facility]   [address], [address]  [place] [address]  [phone]  Susan Hamilton, MSN, APRN, PMHNP-BC    NAME: Eve Skelton     : 1960   MRN: [id number]     Patient Care Team:  Yvan Calderon MD as PCP - General    DATE: 2024    Subjective     CHIEF COMPLAINT: depression and anxiety     HPI:  Eve Skelton, a 64 y.o. female patient seen for follow up for psychiatric medication management.     Medication adjustments last visit:   Continue trazodone at night as needed.   Continue vilazodone 20mg.   Stop Wellbutrin.   Continue Vraylar, increase to 3mg.   Continue lorazepam as needed.  Advised we would try to decrease use of this medication over time. She was primarily using it for sleep. Hopefully the trazodone will help with sleep and she wont need as much.    24: Patient here for follow up today. She is taking either 1-2 trazodone at night for sleep. It works well most of the time. She is sleeping 7-12 hours.     She is still taking vilazodone 20mg, Wellbutrin XL 150mg, and Vraylar 3mg for mood. She feels like depression is better, but she still reports low energy, low interest, low motivation.     She watches her grandson who is 16, who is autistic. He goes to school a couple hours in the morning. He is verbal, but does have some behaviors. Never had any behaviors toward her.     Still talking with her counselor once a month via telephone.     We discussed adding a small dose of Adderall in the morning to help with the chronic fatigue, low energy, low motivation. Patient advised of risks and side effects. States she doesn't have any cardiac history. She does have high blood pressure, but it is controlled on medications. I advised her to check her blood pressure at home the first few days she takes the medication to make sure it is not raising her blood pressure. She does have equipment at home to check it and she was agreeable and  expressed understanding.     Denies suicidal thoughts.     9/18/24: Patient here for follow up today.   She states she could tell the difference since starting the Vraylar. States it was a positive improvement. She was mistakenly taking 2 at a time, and was taking 3mg. She states she felt better on the 3mg, since she went back to 1.5 she could tell.  Taking trazodone every other night or as she needs it.   Denies any suicidal thoughts. States she and her  are getting along better as well.Overall doing well. We discussed her doing a trial of stopping the Wellbutrin. She doesn't feel as though she gets much benefit from it. She is going to try that and see how she does.        7/17/24: Initial Intake   She is on Bupropion 300mg, abilify 5, vilazoodone 40mg, lorazepam   Started ablify 5mg, lorazepam 1mg daily PRN.     Symptoms: States she is depressed a lot of the time, little interest or pleasure in doing things, difficulty sleeping.     States her  contributes to her depression. They have been together 40 years. He was in the [employer]. He is retired now. She says just his tone of voice can set her off.     She has been off work since 2008.   Patient was seeing Dr. Mckinney for 4-5 years prior to her retiring. She saw Dr. Bell before that.     Medications:   Prozac--she feels like this worked. She is unsure why they changed it. Around 1990.   Amitriptyline--this worked for sleep but made her tired.   Lorazepam 1mg--this works if she takes a whole tablet.     Family psych disorder:   Son-anxiety, depression  Daughter--bipolar  Dad--undiagnosed depression, and alcoholism     Adriana Lozano-counselor. She sees her once a month. She has been with her about 5 years.     She was at [facility] in 2009: She had an episode where she couldn't stop crying.     Denies any suicidal thoughts recently.     She states her daughter was recently diagnosed with bipolar disorder, and she feels like she has a lot of the same  symptoms. She completed a MDQ which was positive for bipolar disorder.     PRIOR PSYCH MEDICATIONS:  Ambien--she got up and did things int he middle of the night.   Prozac--she feels like this worked. She is unsure why they changed it. Around 1990.   Amitriptyline--this worked for sleep but made her tired.   Lorazepam 1mg--this works if she takes a whole tablet.     PRIOR PSYCH DX:   Depression   Anxiety     PRIOR MENTAL HEALTH PROVIDERS:  Dr. Hank Bell    PSYCH ADMISSIONS:   One admission in 2009 at [facility]    SELF HARM/SUICIDALLY:   Denies     Patient presents with symptoms and behaviors that are consistent with the following DSM-5 diagnoses:  Bipolar II disorder   2. Generalized anxiety disorder   3. Insomnia due to mental condition     Objective     There were no vitals taken for this visit.  No LMP recorded. Patient has had a hysterectomy.    Social History     Occupational History    Not on file   Tobacco Use    Smoking status: Every Day     Current packs/day: 0.50     Types: Cigarettes    Smokeless tobacco: Never   Substance and Sexual Activity    Alcohol use: Yes     Alcohol/week: 1.0 standard drink of alcohol     Types: 1 Glasses of wine per week    Drug use: Never    Sexual activity: Defer     Family History   Problem Relation Age of Onset    No Known Problems Mother     Breast cancer Father     Breast cancer Sister     Breast cancer Brother     No Known Problems Daughter     No Known Problems Son     No Known Problems Maternal Grandmother     No Known Problems Paternal Grandmother     No Known Problems Maternal Aunt     No Known Problems Paternal Aunt       Past Medical History:   Diagnosis Date    Anxiety     Arthritis     Breast cancer     Diabetes     Elevated cholesterol     GERD (gastroesophageal reflux disease)     Headache     History of transfusion     HTN (hypertension)     RLS (restless legs syndrome)     Sleep apnea      Past Surgical History:   Procedure Laterality Date    BREAST  BIOPSY Right     BREAST EXCISIONAL BIOPSY Left     ORIF HUMERUS FRACTURE Left 12/3/2020    Procedure: HUMERUS PROXIMAL OPEN REDUCTION INTERNAL FIXATION;  Surgeon: Yvan Echols MD;  Location: [facility] MAIN OR;  Service: Orthopedics;  Laterality: Left;      Review of Systems     The following portions of the patient's history were reviewed and updated as appropriate: allergies, current medications, past family history, past medical history, past social history, past surgical history and problem list.      Allergy:   Allergies   Allergen Reactions    Vancomycin Other (See Comments)     Ringing of ears         Discontinued Medications:  Medications Discontinued During This Encounter   Medication Reason    traZODone (DESYREL) 50 MG tablet Reorder    buPROPion XL (WELLBUTRIN XL) 150 MG 24 hr tablet Reorder       Current Medications:   Current Outpatient Medications   Medication Sig Dispense Refill    aspirin 81 MG tablet Take 1 tablet by mouth Daily.      baclofen (LIORESAL) 10 MG tablet       buPROPion XL (WELLBUTRIN XL) 150 MG 24 hr tablet Take 1 tablet by mouth Every Morning. 90 tablet 3    Butalbital-APAP-Caff-Cod -28-30 MG capsule Take 1 tablet by mouth As Needed (severe headache).      Ca Phosphate-Cholecalciferol (Calcium 500 + D3) 250-500 MG-UNIT chewable tablet Chew 1 tablet Daily.      Cariprazine HCl (Vraylar) 3 MG capsule capsule Take 1 capsule by mouth Every Morning. 90 capsule 1    carisoprodol (SOMA) 250 MG tablet Take 1 tablet by mouth 3 (Three) Times a Day As Needed for Muscle Spasms.      folic acid (FOLVITE) 1 MG tablet       FREESTYLE LITE test strip       hydroCHLOROthiazide (HYDRODIURIL) 25 MG tablet Take 1 tablet by mouth Daily.      LORazepam (ATIVAN) 1 MG tablet Take 1 tablet by mouth Daily As Needed for Anxiety.      meloxicam (MOBIC) 15 MG tablet Take 1 tablet by mouth Daily.      Omega-3 Fatty Acids (FISH OIL) 1000 MG capsule capsule Take 1 capsule by mouth 2 (Two) Times a Day With  Meals.      omeprazole (priLOSEC) 20 MG capsule Take 1 capsule by mouth Daily.      polyethyl glycol-propyl glycol (SYSTANE) 0.4-0.3 % solution ophthalmic solution Administer 2 drops to both eyes Every 1 (One) Hour As Needed.      pravastatin (PRAVACHOL) 40 MG tablet Take 1 tablet by mouth Every Night.      traZODone (DESYREL) 50 MG tablet TAKE 1T BY MOUTH AT BEDTIME,IF CANT FALL ALSEEP TAKE ANOTHER TABLET 1 HOUR AFTER 180 tablet 3    valACYclovir (VALTREX) 500 MG tablet Take 1 tablet by mouth Daily As Needed.      valsartan (DIOVAN) 320 MG tablet Take 1 tablet by mouth Daily.      vilazodone (VIIBRYD) 20 MG tablet tablet Take 1 tablet by mouth Daily. 90 tablet 1    amphetamine-dextroamphetamine (Adderall) 10 MG tablet Take 1 tablet by mouth Every Morning. 30 tablet 0    Cariprazine HCl (Vraylar) 3 MG capsule capsule Take 1 capsule by mouth Daily. (Patient not taking: Reported on 12/16/2024) 14 capsule 0    Cholecalciferol 25 MCG (1000 UT) tablet Take 2 tablets by mouth Daily. (Patient not taking: Reported on 12/16/2024)      glycopyrrolate (ROBINUL) 2 MG tablet  (Patient not taking: Reported on 12/16/2024)      HYDROcodone-acetaminophen (NORCO) 7.5-325 MG per tablet Take 1 tablet by mouth Every 6 (Six) Hours As Needed for Moderate Pain. (Patient not taking: Reported on 12/16/2024)      L-Methylfolate-Algae 15-90.314 MG capsule Take 1 capsule by mouth Daily. (Patient not taking: Reported on 12/16/2024)      metFORMIN ER (GLUCOPHAGE-XR) 500 MG 24 hr tablet Take 1 tablet by mouth. (Patient not taking: Reported on 12/16/2024)      sorbitol 70 % solution solution See Admin Instructions. (Patient not taking: Reported on 12/16/2024)      tobramycin-dexamethasone (TOBRADEX) 0.3-0.1 % ophthalmic suspension  (Patient not taking: Reported on 12/16/2024)      Turmeric 500 MG tablet Take 1 tablet by mouth Daily. (Patient not taking: Reported on 12/16/2024)       No current facility-administered medications for this visit.       MENTAL STATUS EXAM   General Appearance:  Cleanly groomed and dressed  Eye Contact:  Good eye contact  Attitude:  Cooperative  Motor Activity:  Normal gait, posture  Muscle Strength:  Normal  Speech:  Normal rate, tone, volume  Language:  Spontaneous  Mood and affect:  Flat  Hopelessness:  Denies  Loneliness: Denies  Thought Process:  Logical and goal-directed  Associations/ Thought Content:  No delusions  Hallucinations:  None  Suicidal Ideations:  Not present  Homicidal Ideation:  Not present  Sensorium:  Alert  Orientation:  Person, place, time and situation  Immediate Recall, Recent, and Remote Memory:  Intact  Attention Span/ Concentration:  Good  Fund of Knowledge:  Appropriate for age and educational level  Intellectual Functioning:  Average range  Insight:  Good  Judgement:  Good  Reliability:  Good  Impulse Control:  Good     PHQ-9 Depression Screening  Little interest or pleasure in doing things? Over half   Feeling down, depressed, or hopeless? Not at all   PHQ-2 Total Score 2   Trouble falling or staying asleep, or sleeping too much? Over half   Feeling tired or having little energy? Over half   Poor appetite or overeating? Several days   Feeling bad about yourself - or that you are a failure or have let yourself or your family down? Several days   Trouble concentrating on things, such as reading the newspaper or watching television? Not at all   Moving or speaking so slowly that other people could have noticed? Or the opposite - being so fidgety or restless that you have been moving around a lot more than usual? Not at all   Thoughts that you would be better off dead, or of hurting yourself in some way? Not at all   PHQ-9 Total Score 8   If you checked off any problems, how difficult have these problems made it for you to do your work, take care of things at home, or get along with other people? Somewhat difficult           GAD7 Documentation:  Feeling nervous, anxious or on edge 0   Not being able to  stop or control worrying 1   Worrying too much about different things 0   Trouble relaxing 1   Being so restless that it is hard to sit still 0   Becoming easily annoyed or irritable 0   Feeling Afraid as if something awful might happen 0   MORE Total Score 2   How difficult have these problems made it for you? Not difficult at all     Current every day smoker less than 3 minutes spent counseling Not agreeable to stopping    I advised Eve of the risks of tobacco use.     Result Review:    Labs:  No visits with results within 3 Month(s) from this visit.   Latest known visit with results is:   Office Visit on 07/17/2024   Component Date Value Ref Range Status    Report Summary 07/17/2024 FINAL   Final    Comment: ====================================================================  Tox[employer]ure Flex 22, Ur w/DL  ====================================================================  Test                             Result       Flag       Units  Drug Present and Declared for Prescription Verification    Lorazepam                      320          EXPECTED   ng/mg creat     Source of lorazepam is a scheduled prescription medication.    Bupropion                      PRESENT      EXPECTED    Hydroxybupropion               PRESENT      EXPECTED     Hydroxybupropion is an expected metabolite of bupropion.    Vilazodone                     PRESENT      EXPECTED  Drug Absent but Declared for Prescription Verification    Trazodone                      Not Detected UNEXPECTED  ====================================================================  Test                      Result    Flag   Units      Ref Range    Creatinine              20               mg/dL      >=20  =======================================                           =============================  Declared Medications:   The flagging and interpretation on this report are based on the   following declared medications.  Unexpected results may arise from    inaccuracies in the declared medications.   **Note: The testing scope of this panel includes these medications:   Bupropion   Lorazepam   Trazodone   Vilazodone   **Note: The testing scope of this panel does not include following   reported medications:   Aspirin (Aspirin 81)   Baclofen   Calcium   Cariprazine (Vraylar)   Cholecalciferol   Dexamethasone   Eye Drop (Systane)   Folic acid   Hydrochlorothiazide   Levomefolic Acid (Methylfolate)   Meloxicam   Omeprazole   Pravastatin   Tobramycin   Turmeric   Valacyclovir   Valsartan   Vitamin D3  ====================================================================  For clinical consultation, please call [phone].  ====================================================================      CREATININE 07/17/2024 20  mg/dL Final    REFERENCE RANGE: Ref Range>=20    Amphetamines, IA 07/17/2024 Negative  CUTOFF:300 ng/mL Final    Benzodiazepines 07/17/2024 +POSITIVE+   Final    Diazepam Urine, Qualitative 07/17/2024 Not Detected  ng/mg creat Final    Desmethyldiazepam 07/17/2024 Not Detected  ng/mg creat Final    Oxazepam, urine 07/17/2024 Not Detected  ng/mg creat Final    Temazepam 07/17/2024 Not Detected  ng/mg creat Final    Comment: Expected metabolism of benzodiazepine class drugs:   Parent Drug       Detected Metabolites   -----------       --------------------   Diazepam:         Desmethyldiazepam, Temazepam, Oxazepam   Chlordiazepoxide: Desmethyldiazepam, Oxazepam   Clorazepate:      Desmethyldiazepam, Oxazepam   Halazepam:        Desmethyldiazepam, Oxazepam   Temazepam:        Oxazepam   Oxazepam:         None      Alprazolam Urine, Conf 07/17/2024 Not Detected  ng/mg creat Final    Alpha-hydroxyalprazolam, Urine 07/17/2024 Not Detected  ng/mg creat Final    Desalkylflurazepam, Urine 07/17/2024 Not Detected  ng/mg creat Final    Lorazepam, Urine 07/17/2024 320  ng/mg creat Final    Alpha-hydroxytriazolam, Urine 07/17/2024 Not Detected  ng/mg creat Final     Clonazepam 07/17/2024 Not Detected  ng/mg creat Final    7- AMINOCLONAZEPAM 07/17/2024 Not Detected  ng/mg creat Final    Midazolam, Urine 07/17/2024 Not Detected  ng/mg creat Final    Alpha-hydroxymidazolam, Urine 07/17/2024 Not Detected  ng/mg creat Final    Flunitrazepam 07/17/2024 Not Detected  ng/mg creat Final    DESMETHYLFLUNITRAZEPAM 07/17/2024 Not Detected  ng/mg creat Final    COCAINE / METABOLITE, IA 07/17/2024 Negative  CUTOFF:150 ng/mL Final    Ethanol and Ethanol Biomarkers 07/17/2024 Negative  CUTOFF:500 ng/mL Final    Cannavinoids IA 07/17/2024 Negative  CUTOFF:20 ng/mL Final    6-Acetylmorphine IA 07/17/2024 Negative  CUTOFF:10 ng/mL Final    Opiate Class IA 07/17/2024 Negative  CUTOFF:100 ng/mL Final    Oxycodone Class IA 07/17/2024 Negative  CUTOFF:100 ng/mL Final    METHADONE, IA 07/17/2024 Negative  CUTOFF:100 ng/mL Final    Methadone MTB IA 07/17/2024 Negative  CUTOFF:100 ng/mL Final    BUPRENORPHINE IA 07/17/2024 Negative  CUTOFF:5.0 ng/mL Final    FENTANYL, IA 07/17/2024 Negative  CUTOFF:2.0 ng/mL Final    Tapentadol, IA 07/17/2024 Negative  CUTOFF:200 ng/mL Final    PROPOXYPHENE, IA 07/17/2024 Negative  CUTOFF:300 ng/mL Final    TRAMADOL, IA 07/17/2024 Negative  CUTOFF:200 ng/mL Final    METHYLPHENIDATE IA 07/17/2024 Negative  CUTOFF:100 ng/mL Final    Barbiturates, IA 07/17/2024 Negative  CUTOFF:200 ng/mL Final    PHENCYCLIDINE, IA 07/17/2024 Negative  CUTOFF:25 ng/mL Final    ANTICONVULSANTS 07/17/2024 Negative   Final    Pregabalin 07/17/2024 Not Detected   Final    Gabapentin, IA 07/17/2024 Negative  CUTOFF:1.0 ug/mL Final    Carisoprodol, IA 07/17/2024 Negative  CUTOFF:100 ng/mL Final    Antidepressants 07/17/2024 +POSITIVE+   Final    Amitriptyline 07/17/2024 Not Detected   Final    Amoxapine 07/17/2024 Not Detected   Final    8-Hydroxyamoxapine, Ur 07/17/2024 Not Detected   Final    Bupropion, Ur 07/17/2024 PRESENT   Final    Hydroxybupropion 07/17/2024 PRESENT   Final    Citalopram  07/17/2024 Not Detected   Final    Desmethylcitalopram 07/17/2024 Not Detected   Final    Clomipramine, Ur 07/17/2024 Not Detected   Final    Desmethylclomipramine 07/17/2024 Not Detected   Final    Desipramine 07/17/2024 Not Detected   Final    Doxepin 07/17/2024 Not Detected   Final    Desmethyldoxepin, Ur 07/17/2024 Not Detected   Final    Duloxetine, Ur 07/17/2024 Not Detected   Final    Fluoxetine, Ur 07/17/2024 Not Detected   Final    Norfluoxetine, Ur 07/17/2024 Not Detected   Final    Fluvoxamine 07/17/2024 Not Detected   Final    Imipramine 07/17/2024 Not Detected   Final    Mirtazapine 07/17/2024 Not Detected   Final    Nortriptyline 07/17/2024 Not Detected   Final    Paroxetine 07/17/2024 Not Detected   Final    Protriptyline 07/17/2024 Not Detected   Final    Sertraline, Ur 07/17/2024 Not Detected   Final    Desmethylsertraline 07/17/2024 Not Detected   Final    Maprotiline 07/17/2024 Not Detected   Final    Nefazodone, Ur 07/17/2024 Not Detected   Final    Trazodone 07/17/2024 Not Detected   Final    1,3 chlorophenyl piperazine 07/17/2024 Not Detected   Final    Trimipramine 07/17/2024 Not Detected   Final    Venlafaxine 07/17/2024 Not Detected   Final    Desmethylvenlafaxine, Ur 07/17/2024 Not Detected   Final    Vilazodone, Ur 07/17/2024 PRESENT   Final    Antipsychotics, Ur 07/17/2024 Negative   Final    Chlorpromazine 07/17/2024 Not Detected   Final    Clozapine, Ur 07/17/2024 Not Detected   Final    Desmethylclozapine, Ur 07/17/2024 Not Detected   Final    Loxapine, Ur 07/17/2024 Not Detected   Final    8-Hydroxyloxapine 07/17/2024 Not Detected   Final    Mesoridazine 07/17/2024 Not Detected   Final    Olanzapine 07/17/2024 Not Detected   Final    Quetiapine 07/17/2024 Not Detected   Final    Risperidone 07/17/2024 Not Detected   Final    Fluphenazine 07/17/2024 Not Detected   Final    Haloperidol 07/17/2024 Not Detected   Final    THIORIDAZINE, UR 07/17/2024 Not Detected   Final    Molindone, Ur  07/17/2024 Not Detected   Final    Pimozide, Ur 07/17/2024 Not Detected   Final    Prochlorperazine, Ur 07/17/2024 Not Detected   Final    Thiothixene 07/17/2024 Not Detected   Final    Trifluoperazine 07/17/2024 Not Detected   Final    Ziprasidone 07/17/2024 Not Detected   Final    Perphenazine, Ur 07/17/2024 Not Detected   Final    Aripiprazole 07/17/2024 Not Detected   Final    Asenapine 07/17/2024 Not Detected   Final    Iloperidone 07/17/2024 Not Detected   Final    Lurasidone 07/17/2024 Not Detected   Final    KRATOM IA 07/17/2024 Negative  CUTOFF:5.0 ng/mL Final       Assessment & Plan   Diagnoses and all orders for this visit:    1. Bipolar II disorder (Primary)  -     buPROPion XL (WELLBUTRIN XL) 150 MG 24 hr tablet; Take 1 tablet by mouth Every Morning.  Dispense: 90 tablet; Refill: 3    2. Chronic fatigue  -     amphetamine-dextroamphetamine (Adderall) 10 MG tablet; Take 1 tablet by mouth Every Morning.  Dispense: 30 tablet; Refill: 0  -     buPROPion XL (WELLBUTRIN XL) 150 MG 24 hr tablet; Take 1 tablet by mouth Every Morning.  Dispense: 90 tablet; Refill: 3    3. Insomnia due to mental condition  -     traZODone (DESYREL) 50 MG tablet; TAKE 1T BY MOUTH AT BEDTIME,IF CANT FALL ALSEEP TAKE ANOTHER TABLET 1 HOUR AFTER  Dispense: 180 tablet; Refill: 3    4. Generalized anxiety disorder  -     buPROPion XL (WELLBUTRIN XL) 150 MG 24 hr tablet; Take 1 tablet by mouth Every Morning.  Dispense: 90 tablet; Refill: 3        Continue trazodone 50-100mg  at night as needed.   Continue vilazodone 20mg.   Continue Wellbutrin XL 150mg daily.   Continue Vraylar 3mg.   Continue lorazepam as needed. Patient taking rarely.   Start Adderall 10mg in the morning.     Visit Diagnoses:    ICD-10-CM ICD-9-CM   1. Bipolar II disorder  F31.81 296.89   2. Chronic fatigue  R53.82 780.79   3. Insomnia due to mental condition  F51.05 300.9     327.02   4. Generalized anxiety disorder  F41.1 300.02         Pt history, review of  systems, medications, allergies, reviewed, patient was screened today for depression/anxiety, PHQ/MORE scores reviewed.  Most recent vitals/labs reviewed.  Pt was given appropriate time to ask questions and concerns were addressed. A thorough discussion was had that included review of disease process, need for continued monitoring and additional treatment options including use of pharmacological and non-pharmacological approaches to care, decisions were made and agreed upon by patient and provider.   Discussed the risks, benefits, and potential side effects of the medications; patient ackowledged and verbally consented.     TREATMENT PLAN/GOALS: Continue supportive psychotherapy efforts and medications as indicated. Treatment and medication options discussed during today's visit. Patient ackowledged and verbally consented to continue with current treatment plan and was educated on the importance of compliance with treatment and follow-up appointments.    -Short-Term Goals: Patient will be compliant with medication management and note improvement in S/S over the next 4 to 6 weeks or at next scheduled visit.  -Long-Term Goals: Patient will be compliant with the agreed treatment plan including medication regimen & F/U appt's and deny impairment in daily functioning over the next 6 months.      CRISIS RESOURCES:    In the event you have personal crisis, there are several resources to reach someone to talk with:    988 Suicide and Crisis Lifeline  Call or text 988 or chat 988NetScientificline.org  [facility]'s National Helpline  1-228-960-HELP (4357)  Text your zip code to [address] (HELP4U)  [place]'s Crisis Line  Dial 988, then press 1  Text 946677    No show policy:  We understand unexpected circumstances arise; however, anytime you miss your appointment we are unable to provide you appropriate care.  In addition, each appointment missed could have been used to provide care for others.  We ask that you call at least 24 hours in  advance to cancel or reschedule an appointment. We would like to take this opportunity to remind you of our policy stating patients who miss THREE appointments without cancelling or rescheduling 24 hours in advance of the appointment may be subject to cancellation of any further visits with our clinic. Please call [phone] to reschedule your appointment. If there are reasons that make it difficult for you to keep the appointments, please call and let us know how we can help. Please understand that medication prescribing will not continue without seeing your provider.        MEDICATION ISSUES:  INSPECT reviewed as expected    Discussed medication options and treatment plan of prescribed medication as well as the risks, benefits, and side effects including potential falls, possible impaired driving and metabolic adversities among others. Patient is agreeable to call the office with any worsening of symptoms or onset of side effects. Patient is agreeable to call 911 or go to the nearest ER should he/she begin having SI/HI. No medication side effects or related complaints today.     MEDS ORDERED DURING VISIT:  New Medications Ordered This Visit   Medications    amphetamine-dextroamphetamine (Adderall) 10 MG tablet     Sig: Take 1 tablet by mouth Every Morning.     Dispense:  30 tablet     Refill:  0    buPROPion XL (WELLBUTRIN XL) 150 MG 24 hr tablet     Sig: Take 1 tablet by mouth Every Morning.     Dispense:  90 tablet     Refill:  3    traZODone (DESYREL) 50 MG tablet     Sig: TAKE 1T BY MOUTH AT BEDTIME,IF CANT FALL ALSEEP TAKE ANOTHER TABLET 1 HOUR AFTER     Dispense:  180 tablet     Refill:  3       Return in about 3 months (around 3/16/2025).         This document has been electronically signed by BRENNEN Martines  December 16, 2024 08:53 EST    Part of this note may be an electronic transcription/translation of spoken language to printed text using the Dragon Dictation System. Some of the data in  this electronic note has been brought forward from a previous encounter, any necessary changes have been made, it has been reviewed by this APRN, and it is accurate.

## 2024-12-16 ENCOUNTER — OFFICE VISIT (OUTPATIENT)
Dept: PSYCHIATRY | Facility: CLINIC | Age: 64
End: 2024-12-16
Payer: OTHER GOVERNMENT

## 2024-12-16 DIAGNOSIS — F31.81 BIPOLAR II DISORDER: Primary | Chronic | ICD-10-CM

## 2024-12-16 DIAGNOSIS — R53.82 CHRONIC FATIGUE: Chronic | ICD-10-CM

## 2024-12-16 DIAGNOSIS — F51.05 INSOMNIA DUE TO MENTAL CONDITION: Chronic | ICD-10-CM

## 2024-12-16 DIAGNOSIS — F41.1 GENERALIZED ANXIETY DISORDER: Chronic | ICD-10-CM

## 2024-12-16 PROCEDURE — 96127 BRIEF EMOTIONAL/BEHAV ASSMT: CPT

## 2024-12-16 PROCEDURE — 99214 OFFICE O/P EST MOD 30 MIN: CPT

## 2024-12-16 RX ORDER — DEXTROAMPHETAMINE SACCHARATE, AMPHETAMINE ASPARTATE, DEXTROAMPHETAMINE SULFATE AND AMPHETAMINE SULFATE 2.5; 2.5; 2.5; 2.5 MG/1; MG/1; MG/1; MG/1
10 TABLET ORAL EVERY MORNING
Qty: 30 TABLET | Refills: 0 | Status: SHIPPED | OUTPATIENT
Start: 2024-12-16 | End: 2025-12-16

## 2024-12-16 RX ORDER — BUPROPION HYDROCHLORIDE 150 MG/1
150 TABLET ORAL EVERY MORNING
COMMUNITY
Start: 2024-09-27 | End: 2024-12-16 | Stop reason: SDUPTHER

## 2024-12-16 RX ORDER — BUPROPION HYDROCHLORIDE 150 MG/1
150 TABLET ORAL EVERY MORNING
Qty: 90 TABLET | Refills: 3 | Status: SHIPPED | OUTPATIENT
Start: 2024-12-16

## 2024-12-16 RX ORDER — BUTALBITAL, ACETAMINOPHEN, CAFFEINE AND CODEINE PHOSPHATE 300; 50; 40; 30 MG/1; MG/1; MG/1; MG/1
1 CAPSULE ORAL AS NEEDED
COMMUNITY
Start: 2024-09-25

## 2024-12-16 RX ORDER — TRAZODONE HYDROCHLORIDE 50 MG/1
TABLET, FILM COATED ORAL
Qty: 180 TABLET | Refills: 3 | Status: SHIPPED | OUTPATIENT
Start: 2024-12-16

## 2025-02-10 DIAGNOSIS — F31.81 BIPOLAR II DISORDER: Chronic | ICD-10-CM

## 2025-02-10 RX ORDER — CARIPRAZINE 3 MG/1
3 CAPSULE, GELATIN COATED ORAL EVERY MORNING
Qty: 90 CAPSULE | Refills: 3 | Status: SHIPPED | OUTPATIENT
Start: 2025-02-10

## 2025-02-10 NOTE — TELEPHONE ENCOUNTER
Rx Refill Note  Requested Prescriptions     Pending Prescriptions Disp Refills    Vraylar 3 MG capsule capsule [Pharmacy Med Name: VRAYLAR CAPS 3MG] 90 capsule 3     Sig: TAKE 1 CAPSULE EVERY MORNING      Last office visit with prescribing clinician: 12/16/2024   Last telemedicine visit with prescribing clinician: Visit date not found   Next office visit with prescribing clinician: 3/17/2025   Office Visit with Susan Hamilton APRN (12/16/2024)   ToxAssure Flex 22, Ur w/DL - (07/17/2024 00:00)                     Would you like a call back once the refill request has been completed: [] Yes [] No    If the office needs to give you a call back, can they leave a voicemail: [] Yes [] No    Cleo Bailon MA  02/10/25, 09:28 EST

## 2025-02-26 DIAGNOSIS — F41.1 GENERALIZED ANXIETY DISORDER: Chronic | ICD-10-CM

## 2025-02-26 DIAGNOSIS — F31.81 BIPOLAR II DISORDER: Chronic | ICD-10-CM

## 2025-02-26 NOTE — TELEPHONE ENCOUNTER
Rx Refill Note  Requested Prescriptions     Pending Prescriptions Disp Refills    vilazodone (VIIBRYD) 20 MG tablet tablet 90 tablet 1     Sig: Take 1 tablet by mouth Daily.        Last office visit with prescribing clinician: 12/16/2024     Next office visit with prescribing clinician: 3/17/2025     Office Visit with Susan Hamilton APRN (12/16/2024)       Migdalia Dalton  02/26/25, 09:46 EST

## 2025-02-27 RX ORDER — VILAZODONE HYDROCHLORIDE 20 MG/1
20 TABLET ORAL DAILY
Qty: 90 TABLET | Refills: 1 | Status: SHIPPED | OUTPATIENT
Start: 2025-02-27

## 2025-03-16 NOTE — PROGRESS NOTES
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 management and note improvement in S/S over the next 4 to 6 weeks or at next scheduled visit.  -Long-Term Goals: Patient will be compliant with the agreed treatment plan including medication regimen & F/U appt's and deny impairment in daily functioning over the next 6 months.      CRISIS RESOURCES:    In the event you have personal crisis, there are several resources to reach someone to talk with:    988 Suicide and Crisis Lifeline  Call or text 988 or chat [facility]88[facility]  [facility]'s National Helpline  5-476-640-HELP (4357)  Text your zip code to [id number] (HELP4U)  [place]'s Crisis Line  Dial 988, then press 1  Text 555777    No show policy:  We understand unexpected circumstances arise; however, anytime you miss your appointment we are unable to provide you appropriate care.  In addition, each appointment missed could have been used to provide care for others.  We ask that you call at least 24 hours in advance to cancel or reschedule an appointment. We would like to take this opportunity to remind you of our policy stating patients who miss THREE appointments without cancelling or rescheduling 24 hours in advance of the appointment may be subject to cancellation of any further visits with our clinic. Please call  [phone] to reschedule your appointment. If there are reasons that make it difficult for you to keep the appointments, please call and let us know how we can help. Please understand that medication prescribing will not continue without seeing your provider.        MEDICATION ISSUES:  INSPECT reviewed as expected    Discussed medication options and treatment plan of prescribed medication as well as the risks, benefits, and side effects including potential falls, possible impaired driving and metabolic adversities among others. Patient is agreeable to call the office with any worsening of symptoms or onset of side effects. Patient is agreeable to call 911 or go to the nearest ER should he/she begin having SI/HI. No medication side effects or related complaints today.     MEDS ORDERED DURING VISIT:  New Medications Ordered This Visit   Medications    Cariprazine HCl (Vraylar) 1.5 MG capsule capsule     Sig: Take 1 capsule by mouth Every Morning.     Dispense:  30 capsule     Refill:  2     Lot Number?:   [id number]     Expiration Date?:   5/31/2026     Quantity:   28    vilazodone (VIIBRYD) 20 MG tablet tablet     Sig: Take 1 tablet by mouth Daily.     Dispense:  90 tablet     Refill:  1    LORazepam (ATIVAN) 1 MG tablet     Sig: Take 1 tablet by mouth Daily As Needed for Anxiety.     Dispense:  10 tablet     Refill:  0       Return in about 3 months (around 6/17/2025).         This document has been electronically signed by BRENNEN Martines  March 17, 2025 08:09 EDT    Part of this note may be an electronic transcription/translation of spoken language to printed text using the Dragon Dictation System.     Some of the data in this electronic note has been brought forward from a previous encounter, any necessary changes have been made, it has been reviewed by this APRN, and it is accurate.

## 2025-03-17 ENCOUNTER — OFFICE VISIT (OUTPATIENT)
Dept: PSYCHIATRY | Facility: CLINIC | Age: 65
End: 2025-03-17
Payer: OTHER GOVERNMENT

## 2025-03-17 DIAGNOSIS — F31.81 BIPOLAR II DISORDER: Primary | Chronic | ICD-10-CM

## 2025-03-17 DIAGNOSIS — F41.1 GENERALIZED ANXIETY DISORDER: Chronic | ICD-10-CM

## 2025-03-17 DIAGNOSIS — F51.05 INSOMNIA DUE TO MENTAL CONDITION: Chronic | ICD-10-CM

## 2025-03-17 PROCEDURE — 96127 BRIEF EMOTIONAL/BEHAV ASSMT: CPT

## 2025-03-17 PROCEDURE — 99214 OFFICE O/P EST MOD 30 MIN: CPT

## 2025-03-17 RX ORDER — VILAZODONE HYDROCHLORIDE 20 MG/1
20 TABLET ORAL DAILY
Qty: 90 TABLET | Refills: 1 | Status: SHIPPED | OUTPATIENT
Start: 2025-03-17

## 2025-03-17 RX ORDER — LORAZEPAM 1 MG/1
1 TABLET ORAL DAILY PRN
Qty: 10 TABLET | Refills: 0 | Status: SHIPPED | OUTPATIENT
Start: 2025-03-17

## 2025-03-17 RX ORDER — AMLODIPINE BESYLATE 2.5 MG/1
2.5 TABLET ORAL DAILY
COMMUNITY
Start: 2025-02-03

## 2025-04-10 ENCOUNTER — TELEPHONE (OUTPATIENT)
Dept: PSYCHIATRY | Facility: CLINIC | Age: 65
End: 2025-04-10
Payer: OTHER GOVERNMENT

## 2025-04-10 DIAGNOSIS — F31.81 BIPOLAR II DISORDER: Chronic | ICD-10-CM

## 2025-04-10 NOTE — TELEPHONE ENCOUNTER
Pt says she has seen no difference in Vraylar 1.5 mg, so she was wondering if you want her to go up to 3 mg?

## 2025-04-14 ENCOUNTER — TRANSCRIBE ORDERS (OUTPATIENT)
Dept: ADMINISTRATIVE | Facility: HOSPITAL | Age: 65
End: 2025-04-14
Payer: OTHER GOVERNMENT

## 2025-04-14 DIAGNOSIS — M85.80 OTHER SPECIFIED DISORDERS OF BONE DENSITY AND STRUCTURE, UNSPECIFIED SITE: ICD-10-CM

## 2025-04-14 DIAGNOSIS — Z12.31 ENCOUNTER FOR SCREENING MAMMOGRAM FOR MALIGNANT NEOPLASM OF BREAST: Primary | ICD-10-CM

## 2025-05-09 ENCOUNTER — HOSPITAL ENCOUNTER (OUTPATIENT)
Dept: BONE DENSITY | Facility: HOSPITAL | Age: 65
Discharge: HOME | End: 2025-05-09
Payer: OTHER GOVERNMENT

## 2025-05-09 ENCOUNTER — HOSPITAL ENCOUNTER (OUTPATIENT)
Dept: MAMMOGRAPHY | Facility: HOSPITAL | Age: 65
Discharge: HOME | End: 2025-05-09
Payer: OTHER GOVERNMENT

## 2025-05-09 DIAGNOSIS — M85.80 OTHER SPECIFIED DISORDERS OF BONE DENSITY AND STRUCTURE, UNSPECIFIED SITE: ICD-10-CM

## 2025-05-09 DIAGNOSIS — Z12.31 ENCOUNTER FOR SCREENING MAMMOGRAM FOR MALIGNANT NEOPLASM OF BREAST: ICD-10-CM

## 2025-05-09 PROCEDURE — 77080 DXA BONE DENSITY AXIAL: CPT

## 2025-05-09 PROCEDURE — 77063 BREAST TOMOSYNTHESIS BI: CPT

## 2025-05-09 PROCEDURE — 77067 SCR MAMMO BI INCL CAD: CPT

## 2025-05-27 ENCOUNTER — TELEPHONE (OUTPATIENT)
Dept: PSYCHIATRY | Facility: CLINIC | Age: 65
End: 2025-05-27
Payer: OTHER GOVERNMENT

## 2025-05-27 NOTE — TELEPHONE ENCOUNTER
Eve called to let us know that her Neuro suggested she go off the the Vraylar and onto a different medication.    She needs instructions to come off the Vraylar

## 2025-06-11 RX ORDER — LORAZEPAM 1 MG/1
1 TABLET ORAL DAILY PRN
Qty: 10 TABLET | Refills: 0 | Status: CANCELLED | OUTPATIENT
Start: 2025-06-11

## 2025-06-11 NOTE — PROGRESS NOTES
[facility]   [facility], [address]  [place] [address]  [phone]  Susan Hamilton, MSN, APRN, PMHNP-BC    NAME: Eve Skelton     : 1960   MRN: [id number]     Patient Care Team:  Yvan Calderon MD as PCP - Mehnaz Ritchie APRN (Nurse Practitioner)    DATE: 2025    Subjective     CHIEF COMPLAINT: depression and anxiety     HPI:  Eve Skelton, a 65 y.o. female patient seen for follow up for psychiatric medication management.     Medication adjustments last visit:   Continue trazodone 50-100mg  at night as needed.   Continue vilazodone 20mg.   Continue Wellbutrin XL 150mg daily.   Continue lorazepam as needed. Patient taking rarely.     Patient called on 25 and stated her neuro requested she stop the Vraylar. Advised to stop that day.       Patient or patient representative verbalized consent for the use of Ambient Listening during the visit with  BRENNEN Martines for chart documentation. 2025  08:54 EDT  History of Present Illness  The patient is a 65-year-old female who came in for a follow-up regarding her bipolar 2, generalized anxiety, and insomnia. She is accompanied by her grandson.    Bipolar 2  - She reports that she is doing pretty good overall but sometimes experiences brain fog. She stopped Vraylar at request of neurologist and started carbidopa. Has been on that 3 weeks. Discussed giving the carbidopa some more time, for her to adjust, and then see where things settled out.  - She mentioned that she felt clearer and her symptoms were better controlled when she was on Vraylar.  -Denies any SI/HI/AVH.     Generalized Anxiety  - She continues to take vilazodone, Wellbutrin, trazodone, and lorazepam as needed, although she hasn't used lorazepam recently.    Insomnia  - She continues to take trazodone.    Supplemental information: She is currently taking amlodipine at a dose of 5 mg.    MEDICATIONS  Current:  Vilazodone, Wellbutrin, trazodone, lorazepam (as needed), carbidopa, amlodipine.  Discontinued: Vraylar.      7/17/24: Initial Intake   She is on Bupropion 300mg, abilify 5, vilazoodone 40mg, lorazepam   Started ablify 5mg, lorazepam 1mg daily PRN.     Symptoms: States she is depressed a lot of the time, little interest or pleasure in doing things, difficulty sleeping.     States her  contributes to her depression. They have been together 40 years. He was in the [employer]. He is retired now. She says just his tone of voice can set her off.     She has been off work since 2008.   Patient was seeing Dr. Mckinney for 4-5 years prior to her retiring. She saw Dr. Bell before that.     Medications:   Prozac--she feels like this worked. She is unsure why they changed it. Around 1990.   Amitriptyline--this worked for sleep but made her tired.   Lorazepam 1mg--this works if she takes a whole tablet.     Family psych disorder:   Son-anxiety, depression  Daughter--bipolar  Dad--undiagnosed depression, and alcoholism     Adriana Lozano-counselor. She sees her once a month. She has been with her about 5 years.     She was at [facility] in 2009: She had an episode where she couldn't stop crying.     Denies any suicidal thoughts recently.     She states her daughter was recently diagnosed with bipolar disorder, and she feels like she has a lot of the same symptoms. She completed a MDQ which was positive for bipolar disorder.     PRIOR PSYCH MEDICATIONS:  Ambien--she got up and did things int he middle of the night.   Prozac--she feels like this worked. She is unsure why they changed it. Around 1990.   Amitriptyline--this worked for sleep but made her tired.   Lorazepam 1mg--this works if she takes a whole tablet.     PRIOR PSYCH DX:   Depression   Anxiety     PRIOR MENTAL HEALTH PROVIDERS:  Dr. Hank Bell    PSYCH ADMISSIONS:   One admission in 2009 at [facility]    SELF HARM/SUICIDALLY:   Denies     Patient presents with  symptoms and behaviors that are consistent with the following DSM-5 diagnoses:  Bipolar II disorder   2. Generalized anxiety disorder   3. Insomnia due to mental condition     Objective     There were no vitals taken for this visit.  No LMP recorded. Patient has had a hysterectomy.    Social History     Occupational History    Not on file   Tobacco Use    Smoking status: Every Day     Current packs/day: 0.50     Types: Cigarettes    Smokeless tobacco: Never   Substance and Sexual Activity    Alcohol use: Yes     Alcohol/week: 1.0 standard drink of alcohol     Types: 1 Glasses of wine per week    Drug use: Never    Sexual activity: Defer     Family History   Problem Relation Age of Onset    No Known Problems Mother     Breast cancer Father     Breast cancer Sister     Breast cancer Brother     No Known Problems Daughter     No Known Problems Son     No Known Problems Maternal Grandmother     No Known Problems Paternal Grandmother     No Known Problems Maternal Aunt     No Known Problems Paternal Aunt       Past Medical History:   Diagnosis Date    Anxiety     Arthritis     Breast cancer     Diabetes     Elevated cholesterol     GERD (gastroesophageal reflux disease)     Headache     History of transfusion     HTN (hypertension)     RLS (restless legs syndrome)     Sleep apnea      Past Surgical History:   Procedure Laterality Date    BREAST BIOPSY Right     BREAST EXCISIONAL BIOPSY Left     ORIF HUMERUS FRACTURE Left 12/3/2020    Procedure: HUMERUS PROXIMAL OPEN REDUCTION INTERNAL FIXATION;  Surgeon: Yvan Echols MD;  Location: [facility] MAIN OR;  Service: Orthopedics;  Laterality: Left;      Review of Systems     The following portions of the patient's history were reviewed and updated as appropriate: allergies, current medications, past family history, past medical history, past social history, past surgical history and problem list.      Allergy:   Allergies   Allergen Reactions    Vancomycin Other (See Comments)      Ringing of ears         Discontinued Medications:  Medications Discontinued During This Encounter   Medication Reason    Cariprazine HCl (Vraylar) 3 MG capsule capsule     amLODIPine (NORVASC) 2.5 MG tablet     buPROPion XL (WELLBUTRIN XL) 150 MG 24 hr tablet Reorder    traZODone (DESYREL) 50 MG tablet Reorder    vilazodone (VIIBRYD) 20 MG tablet tablet Reorder           Current Medications:   Current Outpatient Medications   Medication Sig Dispense Refill    amLODIPine (NORVASC) 5 MG tablet Take 1 tablet by mouth Daily.      buPROPion XL (WELLBUTRIN XL) 150 MG 24 hr tablet Take 1 tablet by mouth Every Morning. 90 tablet 1    carbidopa-levodopa (SINEMET)  MG per tablet Take 1 tablet by mouth 3 times a day.      traZODone (DESYREL) 50 MG tablet TAKE 1T BY MOUTH AT BEDTIME,IF CANT FALL ALSEEP TAKE ANOTHER TABLET 1 HOUR AFTER 180 tablet 1    vilazodone (VIIBRYD) 20 MG tablet tablet Take 1 tablet by mouth Daily. 90 tablet 1    aspirin 81 MG tablet Take 1 tablet by mouth Daily.      baclofen (LIORESAL) 10 MG tablet       Butalbital-APAP-Caff-Cod -60-30 MG capsule Take 1 tablet by mouth As Needed (severe headache).      Ca Phosphate-Cholecalciferol (Calcium 500 + D3) 250-500 MG-UNIT chewable tablet Chew 1 tablet Daily.      carisoprodol (SOMA) 250 MG tablet Take 1 tablet by mouth 3 (Three) Times a Day As Needed for Muscle Spasms.      folic acid (FOLVITE) 1 MG tablet       FREESTYLE LITE test strip       hydroCHLOROthiazide (HYDRODIURIL) 25 MG tablet Take 1 tablet by mouth Daily.      LORazepam (ATIVAN) 1 MG tablet Take 1 tablet by mouth Daily As Needed for Anxiety. 10 tablet 0    meloxicam (MOBIC) 15 MG tablet Take 1 tablet by mouth Daily.      Omega-3 Fatty Acids (FISH OIL) 1000 MG capsule capsule Take 1 capsule by mouth 2 (Two) Times a Day With Meals.      omeprazole (priLOSEC) 20 MG capsule Take 1 capsule by mouth Daily.      polyethyl glycol-propyl glycol (SYSTANE) 0.4-0.3 % solution ophthalmic  solution Administer 2 drops to both eyes Every 1 (One) Hour As Needed.      pravastatin (PRAVACHOL) 40 MG tablet Take 1 tablet by mouth Every Night.      valACYclovir (VALTREX) 500 MG tablet Take 1 tablet by mouth Daily As Needed.      valsartan (DIOVAN) 320 MG tablet Take 1 tablet by mouth Daily.       No current facility-administered medications for this visit.      MENTAL STATUS EXAM   General Appearance:  Cleanly groomed and dressed  Eye Contact:  Good eye contact  Attitude:  Cooperative  Motor Activity:  Normal gait, posture  Muscle Strength:  Normal  Speech:  Normal rate, tone, volume  Language:  Spontaneous  Mood and affect:  Flat  Hopelessness:  Denies  Loneliness: Denies  Thought Process:  Logical and goal-directed  Associations/ Thought Content:  No delusions  Hallucinations:  None  Suicidal Ideations:  Not present  Homicidal Ideation:  Not present  Sensorium:  Alert  Orientation:  Person, place, time and situation  Immediate Recall, Recent, and Remote Memory:  Intact  Attention Span/ Concentration:  Good  Fund of Knowledge:  Appropriate for age and educational level  Intellectual Functioning:  Average range  Insight:  Good  Judgement:  Good  Reliability:  Good  Impulse Control:  Good     PHQ-9 Depression Screening  Little interest or pleasure in doing things? Not at all   Feeling down, depressed, or hopeless? Not at all   PHQ-2 Total Score 0   Trouble falling or staying asleep, or sleeping too much? Not at all   Feeling tired or having little energy? Not at all   Poor appetite or overeating? Not at all   Feeling bad about yourself - or that you are a failure or have let yourself or your family down? Several days   Trouble concentrating on things, such as reading the newspaper or watching television? Not at all   Moving or speaking so slowly that other people could have noticed? Or the opposite - being so fidgety or restless that you have been moving around a lot more than usual? Not at all     Thoughts  that you would be better off dead, or of hurting yourself in some way? Not at all   PHQ-9 Total Score 1   If you checked off any problems, how difficult have these problems made it for you to do your work, take care of things at home, or get along with other people? Somewhat difficult           GAD7 Documentation:  Feeling nervous, anxious or on edge 0   Not being able to stop or control worrying 1   Worrying too much about different things 0   Trouble relaxing 0   Being so restless that it is hard to sit still 0   Becoming easily annoyed or irritable 0   Feeling Afraid as if something awful might happen 0   MORE Total Score 1   How difficult have these problems made it for you? Somewhat difficult     Current every day smoker less than 3 minutes spent counseling Not agreeable to stopping    I advised Eve of the risks of tobacco use.     Result Review:    Labs:  No visits with results within 3 Month(s) from this visit.   Latest known visit with results is:   Office Visit on 07/17/2024   Component Date Value Ref Range Status    Report Summary 07/17/2024 FINAL   Final    Comment: ====================================================================  Tox[employer]ure Flex 22, Ur w/DL  ====================================================================  Test                             Result       Flag       Units  Drug Present and Declared for Prescription Verification    Lorazepam                      320          EXPECTED   ng/mg creat     Source of lorazepam is a scheduled prescription medication.    Bupropion                      PRESENT      EXPECTED    Hydroxybupropion               PRESENT      EXPECTED     Hydroxybupropion is an expected metabolite of bupropion.    Vilazodone                     PRESENT      EXPECTED  Drug Absent but Declared for Prescription Verification    Trazodone                      Not Detected UNEXPECTED  ====================================================================  Test                       Result    Flag   Units      Ref Range    Creatinine              20               mg/dL      >=20  =======================================                           =============================  Declared Medications:   The flagging and interpretation on this report are based on the   following declared medications.  Unexpected results may arise from   inaccuracies in the declared medications.   **Note: The testing scope of this panel includes these medications:   Bupropion   Lorazepam   Trazodone   Vilazodone   **Note: The testing scope of this panel does not include following   reported medications:   Aspirin (Aspirin 81)   Baclofen   Calcium   Cariprazine (Vraylar)   Cholecalciferol   Dexamethasone   Eye Drop (Systane)   Folic acid   Hydrochlorothiazide   Levomefolic Acid (Methylfolate)   Meloxicam   Omeprazole   Pravastatin   Tobramycin   Turmeric   Valacyclovir   Valsartan   Vitamin D3  ====================================================================  For clinical consultation, please call [phone].  ====================================================================      CREATININE 07/17/2024 20  mg/dL Final    REFERENCE RANGE: Ref Range>=20    Amphetamines, IA 07/17/2024 Negative  CUTOFF:300 ng/mL Final    Benzodiazepines 07/17/2024 +POSITIVE+   Final    Diazepam Urine, Qualitative 07/17/2024 Not Detected  ng/mg creat Final    Desmethyldiazepam 07/17/2024 Not Detected  ng/mg creat Final    Oxazepam, urine 07/17/2024 Not Detected  ng/mg creat Final    Temazepam 07/17/2024 Not Detected  ng/mg creat Final    Comment: Expected metabolism of benzodiazepine class drugs:   Parent Drug       Detected Metabolites   -----------       --------------------   Diazepam:         Desmethyldiazepam, Temazepam, Oxazepam   Chlordiazepoxide: Desmethyldiazepam, Oxazepam   Clorazepate:      Desmethyldiazepam, Oxazepam   Halazepam:        Desmethyldiazepam, Oxazepam   Temazepam:        Oxazepam   Oxazepam:          None      Alprazolam Urine, Conf 07/17/2024 Not Detected  ng/mg creat Final    Alpha-hydroxyalprazolam, Urine 07/17/2024 Not Detected  ng/mg creat Final    Desalkylflurazepam, Urine 07/17/2024 Not Detected  ng/mg creat Final    Lorazepam, Urine 07/17/2024 320  ng/mg creat Final    Alpha-hydroxytriazolam, Urine 07/17/2024 Not Detected  ng/mg creat Final    Clonazepam 07/17/2024 Not Detected  ng/mg creat Final    7- AMINOCLONAZEPAM 07/17/2024 Not Detected  ng/mg creat Final    Midazolam, Urine 07/17/2024 Not Detected  ng/mg creat Final    Alpha-hydroxymidazolam, Urine 07/17/2024 Not Detected  ng/mg creat Final    Flunitrazepam 07/17/2024 Not Detected  ng/mg creat Final    DESMETHYLFLUNITRAZEPAM 07/17/2024 Not Detected  ng/mg creat Final    COCAINE / METABOLITE, IA 07/17/2024 Negative  CUTOFF:150 ng/mL Final    Ethanol and Ethanol Biomarkers 07/17/2024 Negative  CUTOFF:500 ng/mL Final    Cannavinoids IA 07/17/2024 Negative  CUTOFF:20 ng/mL Final    6-Acetylmorphine IA 07/17/2024 Negative  CUTOFF:10 ng/mL Final    Opiate Class IA 07/17/2024 Negative  CUTOFF:100 ng/mL Final    Oxycodone Class IA 07/17/2024 Negative  CUTOFF:100 ng/mL Final    METHADONE, IA 07/17/2024 Negative  CUTOFF:100 ng/mL Final    Methadone MTB IA 07/17/2024 Negative  CUTOFF:100 ng/mL Final    BUPRENORPHINE IA 07/17/2024 Negative  CUTOFF:5.0 ng/mL Final    FENTANYL, IA 07/17/2024 Negative  CUTOFF:2.0 ng/mL Final    Tapentadol, IA 07/17/2024 Negative  CUTOFF:200 ng/mL Final    PROPOXYPHENE, IA 07/17/2024 Negative  CUTOFF:300 ng/mL Final    TRAMADOL, IA 07/17/2024 Negative  CUTOFF:200 ng/mL Final    METHYLPHENIDATE IA 07/17/2024 Negative  CUTOFF:100 ng/mL Final    Barbiturates, IA 07/17/2024 Negative  CUTOFF:200 ng/mL Final    PHENCYCLIDINE, IA 07/17/2024 Negative  CUTOFF:25 ng/mL Final    ANTICONVULSANTS 07/17/2024 Negative   Final    Pregabalin 07/17/2024 Not Detected   Final    Gabapentin, IA 07/17/2024 Negative  CUTOFF:1.0 ug/mL Final     Carisoprodol, IA 07/17/2024 Negative  CUTOFF:100 ng/mL Final    Antidepressants 07/17/2024 +POSITIVE+   Final    Amitriptyline 07/17/2024 Not Detected   Final    Amoxapine 07/17/2024 Not Detected   Final    8-Hydroxyamoxapine, Ur 07/17/2024 Not Detected   Final    Bupropion, Ur 07/17/2024 PRESENT   Final    Hydroxybupropion 07/17/2024 PRESENT   Final    Citalopram 07/17/2024 Not Detected   Final    Desmethylcitalopram 07/17/2024 Not Detected   Final    Clomipramine, Ur 07/17/2024 Not Detected   Final    Desmethylclomipramine 07/17/2024 Not Detected   Final    Desipramine 07/17/2024 Not Detected   Final    Doxepin 07/17/2024 Not Detected   Final    Desmethyldoxepin, Ur 07/17/2024 Not Detected   Final    Duloxetine, Ur 07/17/2024 Not Detected   Final    Fluoxetine, Ur 07/17/2024 Not Detected   Final    Norfluoxetine, Ur 07/17/2024 Not Detected   Final    Fluvoxamine 07/17/2024 Not Detected   Final    Imipramine 07/17/2024 Not Detected   Final    Mirtazapine 07/17/2024 Not Detected   Final    Nortriptyline 07/17/2024 Not Detected   Final    Paroxetine 07/17/2024 Not Detected   Final    Protriptyline 07/17/2024 Not Detected   Final    Sertraline, Ur 07/17/2024 Not Detected   Final    Desmethylsertraline 07/17/2024 Not Detected   Final    Maprotiline 07/17/2024 Not Detected   Final    Nefazodone, Ur 07/17/2024 Not Detected   Final    Trazodone 07/17/2024 Not Detected   Final    1,3 chlorophenyl piperazine 07/17/2024 Not Detected   Final    Trimipramine 07/17/2024 Not Detected   Final    Venlafaxine 07/17/2024 Not Detected   Final    Desmethylvenlafaxine, Ur 07/17/2024 Not Detected   Final    Vilazodone, Ur 07/17/2024 PRESENT   Final    Antipsychotics, Ur 07/17/2024 Negative   Final    Chlorpromazine 07/17/2024 Not Detected   Final    Clozapine, Ur 07/17/2024 Not Detected   Final    Desmethylclozapine, Ur 07/17/2024 Not Detected   Final    Loxapine, Ur 07/17/2024 Not Detected   Final    8-Hydroxyloxapine 07/17/2024 Not  Detected   Final    Mesoridazine 07/17/2024 Not Detected   Final    Olanzapine 07/17/2024 Not Detected   Final    Quetiapine 07/17/2024 Not Detected   Final    Risperidone 07/17/2024 Not Detected   Final    Fluphenazine 07/17/2024 Not Detected   Final    Haloperidol 07/17/2024 Not Detected   Final    THIORIDAZINE, UR 07/17/2024 Not Detected   Final    Molindone, Ur 07/17/2024 Not Detected   Final    Pimozide, Ur 07/17/2024 Not Detected   Final    Prochlorperazine, Ur 07/17/2024 Not Detected   Final    Thiothixene 07/17/2024 Not Detected   Final    Trifluoperazine 07/17/2024 Not Detected   Final    Ziprasidone 07/17/2024 Not Detected   Final    Perphenazine, Ur 07/17/2024 Not Detected   Final    Aripiprazole 07/17/2024 Not Detected   Final    Asenapine 07/17/2024 Not Detected   Final    Iloperidone 07/17/2024 Not Detected   Final    Lurasidone 07/17/2024 Not Detected   Final    KRATOM IA 07/17/2024 Negative  CUTOFF:5.0 ng/mL Final       Assessment & Plan   Diagnoses and all orders for this visit:    1. Bipolar II disorder (Primary)  -     vilazodone (VIIBRYD) 20 MG tablet tablet; Take 1 tablet by mouth Daily.  Dispense: 90 tablet; Refill: 1  -     buPROPion XL (WELLBUTRIN XL) 150 MG 24 hr tablet; Take 1 tablet by mouth Every Morning.  Dispense: 90 tablet; Refill: 1    2. Generalized anxiety disorder  -     vilazodone (VIIBRYD) 20 MG tablet tablet; Take 1 tablet by mouth Daily.  Dispense: 90 tablet; Refill: 1  -     buPROPion XL (WELLBUTRIN XL) 150 MG 24 hr tablet; Take 1 tablet by mouth Every Morning.  Dispense: 90 tablet; Refill: 1    3. Insomnia due to mental condition  -     traZODone (DESYREL) 50 MG tablet; TAKE 1T BY MOUTH AT BEDTIME,IF CANT FALL ALSEEP TAKE ANOTHER TABLET 1 HOUR AFTER  Dispense: 180 tablet; Refill: 1    4. Chronic fatigue  -     buPROPion XL (WELLBUTRIN XL) 150 MG 24 hr tablet; Take 1 tablet by mouth Every Morning.  Dispense: 90 tablet; Refill: 1        Assessment & Plan  1. Bipolar 2 disorder:    - Continue current medications: vilazodone, Wellbutrin, trazodone, lorazepam (as needed)  - 90-day supply to [facility]    2. Generalized anxiety disorder:   - Continue current medications: Wellbutrin, lorazepam (as needed)  - 90-day supply to [facility]    3. Insomnia:   - Continue current medications: trazodone  - 90-day supply to [facility]          Continue trazodone 50-100mg  at night as needed.   Continue vilazodone 20mg.   Continue Wellbutrin XL 150mg daily.   Continue lorazepam as needed. Patient taking rarely. (Pt did not need refill today. Advised to call the office if needed)      Visit Diagnoses:    ICD-10-CM ICD-9-CM   1. Bipolar II disorder  F31.81 296.89   2. Generalized anxiety disorder  F41.1 300.02   3. Insomnia due to mental condition  F51.05 300.9     327.02   4. Chronic fatigue  R53.82 780.79       Pt history, review of systems, medications, allergies, reviewed, patient was screened today for depression/anxiety, PHQ/MORE scores reviewed.  Most recent vitals/labs reviewed.  Pt was given appropriate time to ask questions and concerns were addressed. A thorough discussion was had that included review of disease process, need for continued monitoring and additional treatment options including use of pharmacological and non-pharmacological approaches to care, decisions were made and agreed upon by patient and provider.   Discussed the risks, benefits, and potential side effects of the medications; patient ackowledged and verbally consented.     TREATMENT PLAN/GOALS: Continue supportive psychotherapy efforts and medications as indicated. Treatment and medication options discussed during today's visit. Patient ackowledged and verbally consented to continue with current treatment plan and was educated on the importance of compliance with treatment and follow-up appointments.    -Short-Term Goals: Patient will be compliant with medication management and note improvement in S/S over the  next 4 to 6 weeks or at next scheduled visit.  -Long-Term Goals: Patient will be compliant with the agreed treatment plan including medication regimen & F/U appt's and deny impairment in daily functioning over the next 6 months.      CRISIS RESOURCES:    In the event you have personal crisis, there are several resources to reach someone to talk with:    386 Suicide and Crisis Lifeline  Call or text 98 or chat 988CoMentisline[facility]org  [facility]'s National Helpline  2-465-312-HELP (4357)  Text your zip code to 702584 (HELP4U)  [place]'s Crisis Line  Dial 988, then press 1  Text 558793    No show policy:  We understand unexpected circumstances arise; however, anytime you miss your appointment we are unable to provide you appropriate care.  In addition, each appointment missed could have been used to provide care for others.  We ask that you call at least 24 hours in advance to cancel or reschedule an appointment. We would like to take this opportunity to remind you of our policy stating patients who miss THREE appointments without cancelling or rescheduling 24 hours in advance of the appointment may be subject to cancellation of any further visits with our clinic. Please call [phone] to reschedule your appointment. If there are reasons that make it difficult for you to keep the appointments, please call and let us know how we can help. Please understand that medication prescribing will not continue without seeing your provider.        MEDICATION ISSUES:  INSPECT reviewed as expected    Discussed medication options and treatment plan of prescribed medication as well as the risks, benefits, and side effects including potential falls, possible impaired driving and metabolic adversities among others. Patient is agreeable to call the office with any worsening of symptoms or onset of side effects. Patient is agreeable to call 911 or go to the nearest ER should he/she begin having SI/HI. No medication side effects or related  complaints today.     MEDS ORDERED DURING VISIT:  New Medications Ordered This Visit   Medications    vilazodone (VIIBRYD) 20 MG tablet tablet     Sig: Take 1 tablet by mouth Daily.     Dispense:  90 tablet     Refill:  1    traZODone (DESYREL) 50 MG tablet     Sig: TAKE 1T BY MOUTH AT BEDTIME,IF CANT FALL ALSEEP TAKE ANOTHER TABLET 1 HOUR AFTER     Dispense:  180 tablet     Refill:  1    buPROPion XL (WELLBUTRIN XL) 150 MG 24 hr tablet     Sig: Take 1 tablet by mouth Every Morning.     Dispense:  90 tablet     Refill:  1       Return in about 6 months (around 12/16/2025).         This document has been electronically signed by BRENNEN Martines  June 16, 2025 08:54 EDT    Part of this note may be an electronic transcription/translation of spoken language to printed text using the Dragon Dictation System.     Some of the data in this electronic note has been brought forward from a previous encounter, any necessary changes have been made, it has been reviewed by this APRN, and it is accurate.

## 2025-06-16 ENCOUNTER — OFFICE VISIT (OUTPATIENT)
Dept: PSYCHIATRY | Facility: CLINIC | Age: 65
End: 2025-06-16
Payer: OTHER GOVERNMENT

## 2025-06-16 DIAGNOSIS — R53.82 CHRONIC FATIGUE: Chronic | ICD-10-CM

## 2025-06-16 DIAGNOSIS — F41.1 GENERALIZED ANXIETY DISORDER: Chronic | ICD-10-CM

## 2025-06-16 DIAGNOSIS — F31.81 BIPOLAR II DISORDER: Primary | Chronic | ICD-10-CM

## 2025-06-16 DIAGNOSIS — F51.05 INSOMNIA DUE TO MENTAL CONDITION: Chronic | ICD-10-CM

## 2025-06-16 RX ORDER — TRAZODONE HYDROCHLORIDE 50 MG/1
TABLET ORAL
Qty: 180 TABLET | Refills: 1 | Status: SHIPPED | OUTPATIENT
Start: 2025-06-16

## 2025-06-16 RX ORDER — VILAZODONE HYDROCHLORIDE 20 MG/1
20 TABLET ORAL DAILY
Qty: 90 TABLET | Refills: 1 | Status: SHIPPED | OUTPATIENT
Start: 2025-06-16

## 2025-06-16 RX ORDER — AMLODIPINE BESYLATE 5 MG/1
1 TABLET ORAL DAILY
COMMUNITY
Start: 2025-06-07

## 2025-06-16 RX ORDER — CARBIDOPA AND LEVODOPA 25; 100 MG/1; MG/1
1 TABLET ORAL 3 TIMES DAILY
COMMUNITY
Start: 2025-05-22

## 2025-06-16 RX ORDER — BUPROPION HYDROCHLORIDE 150 MG/1
150 TABLET ORAL EVERY MORNING
Qty: 90 TABLET | Refills: 1 | Status: SHIPPED | OUTPATIENT
Start: 2025-06-16

## 2025-07-23 NOTE — PROGRESS NOTES
"Chief Complaint  Sleep Apnea and Restless Legs Syndrome    Subjective          Eve Skelton presents to [facility] NEUROLOGY  History of Present Illness  Yearly f/u for cpap compliance,patient states she sleeps well and benefiting from therapy, patient uses a nasal mask and goes through [employer] for supplies.   She wants a new mask it leaks, and she states she sleeps with her mouth open.      RLS - patient states she no longer has issues w/ rls      SLEEP TESTING HISTORY:    On NPSG at [facility] , 4- patient had Moderate obstructive sleep apnea syndrome with apnea-hypopnea index of 17.0 per sleep hour, minimum SpO2 of 86%    PAP download:  The patient is on CPAP therapy at 10-20 cm/H2O.   Data indicates Excellent compliance. With 100% usage for more than 4 hours with an average usage of 9 hours 56 minutes. AHI down to 6.9 .  Average pressures 12.5.  Average large leak 2sec.     The patient's hypersomnia has resolved       Bronte Sleepiness Scale:  Sitting and reading JS Bronte Sleepiness: 0 WatchingTV JS Bronte Sleepiness: 0  Sitting, inactive, in a public place JS Bronte Sleepiness: 0  As a passenger in a car for 1 hour w/o a break  JS Bronte Sleepiness: 1  Lying down to rest in the afternoon  JS Bronte Sleepiness: 0  Sitting and talking to someone  JS Bronte Sleepiness: 0  Sitting quietly after a lunch  JS Bronte Sleepiness: 0  In a car, while stopped for traffic or a light  JS Bronte Sleepiness: 0  Total 1        Review of Systems   Constitutional:  Negative for fatigue.   Respiratory:  Negative for apnea.    Neurological:  Negative for syncope and headaches.   Psychiatric/Behavioral:  Negative for sleep disturbance.    All other systems reviewed and are negative.        Objective   Vital Signs:   BP 97/60   Pulse 77   Ht 160 cm (63\")   Wt 82.1 kg (181 lb)   BMI 32.06 kg/m²     Physical Exam  Vitals reviewed.   Cardiovascular:      Rate and Rhythm: " Normal rate.   Pulmonary:      Effort: Pulmonary effort is normal.   Neurological:      General: No focal deficit present.      Mental Status: She is alert and oriented to person, place, and time.   Psychiatric:         Mood and Affect: Mood normal.        Result Review :                 Assessment and Plan    Diagnoses and all orders for this visit:    1. MODESTA (obstructive sleep apnea) (Primary)      Continue cpap 10-20  The patient is compliant with and benefiting from PAP therapy.      Follow Up   Return in about 1 year (around 7/28/2026).    Patient was given instructions and counseling regarding her condition or for health maintenance advice. Please see specific information pulled into the AVS if appropriate.       This document has been electronically signed by Joseph Seipel, MD on July 28, 2025 16:28 EDT

## 2025-07-28 ENCOUNTER — OFFICE VISIT (OUTPATIENT)
Dept: NEUROLOGY | Facility: CLINIC | Age: 65
End: 2025-07-28
Payer: OTHER GOVERNMENT

## 2025-07-28 VITALS
BODY MASS INDEX: 32.07 KG/M2 | SYSTOLIC BLOOD PRESSURE: 97 MMHG | WEIGHT: 181 LBS | HEIGHT: 63 IN | HEART RATE: 77 BPM | DIASTOLIC BLOOD PRESSURE: 60 MMHG

## 2025-07-28 DIAGNOSIS — G47.33 OSA (OBSTRUCTIVE SLEEP APNEA): Primary | ICD-10-CM

## 2025-07-28 PROCEDURE — 99213 OFFICE O/P EST LOW 20 MIN: CPT | Performed by: PSYCHIATRY & NEUROLOGY

## 2025-07-28 RX ORDER — NYSTATIN 100000 U/G
OINTMENT TOPICAL
COMMUNITY
Start: 2025-04-14

## 2025-07-28 RX ORDER — CLOBETASOL PROPIONATE 0.5 MG/G
OINTMENT TOPICAL
COMMUNITY
Start: 2025-04-14

## (undated) DEVICE — 3M™ STERI-DRAPE™ U-DRAPE 1015: Brand: STERI-DRAPE™

## (undated) DEVICE — DRILL BIT LOCKING, SHORT: Brand: AXSOS

## (undated) DEVICE — SLV SCD CALF HEMOFORCE DVT THERP REPROC MD

## (undated) DEVICE — KIRSCHNER WIRE
Type: IMPLANTABLE DEVICE | Site: SHOULDER | Status: NON-FUNCTIONAL
Removed: 2020-12-03

## (undated) DEVICE — OCCLUSIVE GAUZE STRIP OVERWRAP,3% BISMUTH TRIBROMOPHENATE IN PETROLATUM BLEND: Brand: XEROFORM

## (undated) DEVICE — SUT VIC 2/0 CT1 36IN

## (undated) DEVICE — 3M™ STERI-DRAPE™ INSTRUMENT POUCH 9097: Brand: 3M™ STERI-DRAPE™

## (undated) DEVICE — 3M™ STERI-DRAPE™ INSTRUMENT POUCH 1018: Brand: STERI-DRAPE™

## (undated) DEVICE — COVER,TABLE,44X90,STERILE: Brand: MEDLINE

## (undated) DEVICE — C-ARM: Brand: UNBRANDED

## (undated) DEVICE — DECANTER: Brand: UNBRANDED

## (undated) DEVICE — SPNG LAP PREWSH SFTPK 18X18IN STRL PK/5

## (undated) DEVICE — SOL IRRIG NACL 1000ML

## (undated) DEVICE — SPNG GZ AVANT 6PLY 4X4IN STRL PK/2

## (undated) DEVICE — PENCL EVAC ULTRAVAC SMOKE W/BLD

## (undated) DEVICE — STAPLER, SKIN, 35W, A: Brand: MEDLINE INDUSTRIES, INC.

## (undated) DEVICE — PK EXTREM 50

## (undated) DEVICE — GOWN,REINFORCE,POLY,SIRUS,BREATH SLV,XLG: Brand: MEDLINE

## (undated) DEVICE — 3M™ IOBAN™ 2 ANTIMICROBIAL INCISE DRAPE 6650EZ: Brand: IOBAN™ 2

## (undated) DEVICE — DRILL BIT NON-LOCKING, SHORT: Brand: AXSOS

## (undated) DEVICE — APPL CHLORAPREP HI/LITE TINTED 10.5ML ORNG

## (undated) DEVICE — GLV SURG SENSICARE PI LF PF 8 GRN STRL

## (undated) DEVICE — GLV SURG SENSICARE PI ORTHO SZ8 LF STRL

## (undated) DEVICE — KT SURG TURNOVER 050